# Patient Record
Sex: FEMALE | Race: WHITE | NOT HISPANIC OR LATINO | Employment: FULL TIME | URBAN - METROPOLITAN AREA
[De-identification: names, ages, dates, MRNs, and addresses within clinical notes are randomized per-mention and may not be internally consistent; named-entity substitution may affect disease eponyms.]

---

## 2023-03-06 DIAGNOSIS — J44.9 CHRONIC OBSTRUCTIVE PULMONARY DISEASE, UNSPECIFIED COPD TYPE (MULTI): Primary | ICD-10-CM

## 2023-03-06 RX ORDER — BENZONATATE 200 MG/1
200 CAPSULE ORAL 3 TIMES DAILY PRN
Qty: 42 CAPSULE | Refills: 5 | Status: SHIPPED | OUTPATIENT
Start: 2023-03-06 | End: 2023-05-01

## 2023-04-25 DIAGNOSIS — Z91.09 OTHER ALLERGY STATUS, OTHER THAN TO DRUGS AND BIOLOGICAL SUBSTANCES: ICD-10-CM

## 2023-04-27 RX ORDER — LORATADINE 10 MG/1
TABLET ORAL
Qty: 90 TABLET | Refills: 3 | Status: SHIPPED | OUTPATIENT
Start: 2023-04-27

## 2023-08-11 ENCOUNTER — TELEPHONE (OUTPATIENT)
Dept: PRIMARY CARE | Facility: CLINIC | Age: 53
End: 2023-08-11
Payer: COMMERCIAL

## 2023-11-03 DIAGNOSIS — U07.1 COVID: Primary | ICD-10-CM

## 2023-12-05 ENCOUNTER — TELEPHONE (OUTPATIENT)
Dept: PRIMARY CARE | Facility: CLINIC | Age: 53
End: 2023-12-05
Payer: COMMERCIAL

## 2023-12-05 RX ORDER — BENZONATATE 200 MG/1
200 CAPSULE ORAL 3 TIMES DAILY PRN
Status: ON HOLD | COMMUNITY
End: 2024-05-07 | Stop reason: WASHOUT

## 2023-12-08 DIAGNOSIS — Z00.00 ENCOUNTER FOR GENERAL ADULT MEDICAL EXAMINATION WITHOUT ABNORMAL FINDINGS: ICD-10-CM

## 2023-12-12 RX ORDER — BENZONATATE 100 MG/1
100 CAPSULE ORAL 3 TIMES DAILY PRN
Qty: 90 CAPSULE | Refills: 1 | Status: SHIPPED | OUTPATIENT
Start: 2023-12-12

## 2024-01-15 ENCOUNTER — APPOINTMENT (OUTPATIENT)
Dept: PRIMARY CARE | Facility: CLINIC | Age: 54
End: 2024-01-15
Payer: COMMERCIAL

## 2024-02-21 ENCOUNTER — APPOINTMENT (OUTPATIENT)
Dept: PRIMARY CARE | Facility: CLINIC | Age: 54
End: 2024-02-21

## 2024-05-06 ENCOUNTER — ANESTHESIA EVENT (OUTPATIENT)
Dept: OPERATING ROOM | Facility: HOSPITAL | Age: 54
DRG: 494 | End: 2024-05-06
Payer: MEDICARE

## 2024-05-06 ENCOUNTER — CLINICAL SUPPORT (OUTPATIENT)
Dept: EMERGENCY MEDICINE | Facility: HOSPITAL | Age: 54
DRG: 494 | End: 2024-05-06
Payer: MEDICARE

## 2024-05-06 ENCOUNTER — APPOINTMENT (OUTPATIENT)
Dept: RADIOLOGY | Facility: HOSPITAL | Age: 54
DRG: 494 | End: 2024-05-06
Payer: MEDICARE

## 2024-05-06 ENCOUNTER — HOSPITAL ENCOUNTER (INPATIENT)
Facility: HOSPITAL | Age: 54
LOS: 1 days | Discharge: HOME | DRG: 494 | End: 2024-05-07
Attending: EMERGENCY MEDICINE | Admitting: ORTHOPAEDIC SURGERY
Payer: MEDICARE

## 2024-05-06 ENCOUNTER — OFFICE VISIT (OUTPATIENT)
Dept: ORTHOPEDIC SURGERY | Facility: HOSPITAL | Age: 54
End: 2024-05-06
Payer: MEDICARE

## 2024-05-06 VITALS — WEIGHT: 164 LBS | BODY MASS INDEX: 32.2 KG/M2 | HEIGHT: 60 IN

## 2024-05-06 DIAGNOSIS — S42.352A DISPLACED COMMINUTED FRACTURE OF SHAFT OF HUMERUS, LEFT ARM, INITIAL ENCOUNTER FOR CLOSED FRACTURE: Primary | ICD-10-CM

## 2024-05-06 DIAGNOSIS — G89.18 ACUTE POSTOPERATIVE PAIN: ICD-10-CM

## 2024-05-06 DIAGNOSIS — S42.302S CLOSED FRACTURE OF SHAFT OF LEFT HUMERUS, UNSPECIFIED FRACTURE MORPHOLOGY, SEQUELA: Primary | ICD-10-CM

## 2024-05-06 PROBLEM — S42.302A CLOSED FRACTURE OF SHAFT OF LEFT HUMERUS: Status: ACTIVE | Noted: 2024-05-06

## 2024-05-06 LAB
ABO GROUP (TYPE) IN BLOOD: NORMAL
ABO GROUP (TYPE) IN BLOOD: NORMAL
ALBUMIN SERPL BCP-MCNC: 4.1 G/DL (ref 3.4–5)
ALP SERPL-CCNC: 79 U/L (ref 33–110)
ALT SERPL W P-5'-P-CCNC: 18 U/L (ref 7–45)
ANION GAP SERPL CALC-SCNC: 16 MMOL/L (ref 10–20)
ANTIBODY SCREEN: NORMAL
APTT PPP: 30 SECONDS (ref 27–38)
AST SERPL W P-5'-P-CCNC: 18 U/L (ref 9–39)
B-HCG SERPL-ACNC: 3 MIU/ML
BASOPHILS # BLD AUTO: 0.08 X10*3/UL (ref 0–0.1)
BASOPHILS NFR BLD AUTO: 0.7 %
BILIRUB SERPL-MCNC: 0.4 MG/DL (ref 0–1.2)
BUN SERPL-MCNC: 15 MG/DL (ref 6–23)
CALCIUM SERPL-MCNC: 9 MG/DL (ref 8.6–10.6)
CHLORIDE SERPL-SCNC: 102 MMOL/L (ref 98–107)
CO2 SERPL-SCNC: 27 MMOL/L (ref 21–32)
CREAT SERPL-MCNC: 0.52 MG/DL (ref 0.5–1.05)
EGFRCR SERPLBLD CKD-EPI 2021: >90 ML/MIN/1.73M*2
EOSINOPHIL # BLD AUTO: 0.25 X10*3/UL (ref 0–0.7)
EOSINOPHIL NFR BLD AUTO: 2.1 %
ERYTHROCYTE [DISTWIDTH] IN BLOOD BY AUTOMATED COUNT: 12.3 % (ref 11.5–14.5)
GLUCOSE SERPL-MCNC: 96 MG/DL (ref 74–99)
HCT VFR BLD AUTO: 37.5 % (ref 36–46)
HGB BLD-MCNC: 12.6 G/DL (ref 12–16)
IMM GRANULOCYTES # BLD AUTO: 0.04 X10*3/UL (ref 0–0.7)
IMM GRANULOCYTES NFR BLD AUTO: 0.3 % (ref 0–0.9)
INR PPP: 0.9 (ref 0.9–1.1)
LYMPHOCYTES # BLD AUTO: 4.25 X10*3/UL (ref 1.2–4.8)
LYMPHOCYTES NFR BLD AUTO: 36.5 %
MCH RBC QN AUTO: 30.3 PG (ref 26–34)
MCHC RBC AUTO-ENTMCNC: 33.6 G/DL (ref 32–36)
MCV RBC AUTO: 90 FL (ref 80–100)
MONOCYTES # BLD AUTO: 0.97 X10*3/UL (ref 0.1–1)
MONOCYTES NFR BLD AUTO: 8.3 %
NEUTROPHILS # BLD AUTO: 6.06 X10*3/UL (ref 1.2–7.7)
NEUTROPHILS NFR BLD AUTO: 52.1 %
NRBC BLD-RTO: 0 /100 WBCS (ref 0–0)
PLATELET # BLD AUTO: 205 X10*3/UL (ref 150–450)
POTASSIUM SERPL-SCNC: 3.5 MMOL/L (ref 3.5–5.3)
PROT SERPL-MCNC: 6.9 G/DL (ref 6.4–8.2)
PROTHROMBIN TIME: 10.1 SECONDS (ref 9.8–12.8)
RBC # BLD AUTO: 4.16 X10*6/UL (ref 4–5.2)
RH FACTOR (ANTIGEN D): NORMAL
RH FACTOR (ANTIGEN D): NORMAL
SODIUM SERPL-SCNC: 141 MMOL/L (ref 136–145)
WBC # BLD AUTO: 11.7 X10*3/UL (ref 4.4–11.3)

## 2024-05-06 PROCEDURE — 96374 THER/PROPH/DIAG INJ IV PUSH: CPT

## 2024-05-06 PROCEDURE — 99285 EMERGENCY DEPT VISIT HI MDM: CPT | Performed by: EMERGENCY MEDICINE

## 2024-05-06 PROCEDURE — 99214 OFFICE O/P EST MOD 30 MIN: CPT | Performed by: FAMILY MEDICINE

## 2024-05-06 PROCEDURE — 99285 EMERGENCY DEPT VISIT HI MDM: CPT | Mod: 25

## 2024-05-06 PROCEDURE — 36415 COLL VENOUS BLD VENIPUNCTURE: CPT

## 2024-05-06 PROCEDURE — 71045 X-RAY EXAM CHEST 1 VIEW: CPT

## 2024-05-06 PROCEDURE — 86901 BLOOD TYPING SEROLOGIC RH(D): CPT | Performed by: EMERGENCY MEDICINE

## 2024-05-06 PROCEDURE — 36415 COLL VENOUS BLD VENIPUNCTURE: CPT | Performed by: EMERGENCY MEDICINE

## 2024-05-06 PROCEDURE — 93010 ELECTROCARDIOGRAM REPORT: CPT | Performed by: EMERGENCY MEDICINE

## 2024-05-06 PROCEDURE — 73030 X-RAY EXAM OF SHOULDER: CPT | Mod: LEFT SIDE | Performed by: RADIOLOGY

## 2024-05-06 PROCEDURE — 73060 X-RAY EXAM OF HUMERUS: CPT | Mod: LT

## 2024-05-06 PROCEDURE — G0378 HOSPITAL OBSERVATION PER HR: HCPCS

## 2024-05-06 PROCEDURE — 73070 X-RAY EXAM OF ELBOW: CPT | Mod: LT

## 2024-05-06 PROCEDURE — 85025 COMPLETE CBC W/AUTO DIFF WBC: CPT | Performed by: EMERGENCY MEDICINE

## 2024-05-06 PROCEDURE — 99222 1ST HOSP IP/OBS MODERATE 55: CPT | Performed by: ORTHOPAEDIC SURGERY

## 2024-05-06 PROCEDURE — 2500000001 HC RX 250 WO HCPCS SELF ADMINISTERED DRUGS (ALT 637 FOR MEDICARE OP)

## 2024-05-06 PROCEDURE — 73070 X-RAY EXAM OF ELBOW: CPT | Mod: LEFT SIDE | Performed by: RADIOLOGY

## 2024-05-06 PROCEDURE — 96376 TX/PRO/DX INJ SAME DRUG ADON: CPT

## 2024-05-06 PROCEDURE — 99204 OFFICE O/P NEW MOD 45 MIN: CPT | Performed by: FAMILY MEDICINE

## 2024-05-06 PROCEDURE — 93005 ELECTROCARDIOGRAM TRACING: CPT

## 2024-05-06 PROCEDURE — 84075 ASSAY ALKALINE PHOSPHATASE: CPT | Performed by: EMERGENCY MEDICINE

## 2024-05-06 PROCEDURE — 96375 TX/PRO/DX INJ NEW DRUG ADDON: CPT

## 2024-05-06 PROCEDURE — 73030 X-RAY EXAM OF SHOULDER: CPT | Mod: LT

## 2024-05-06 PROCEDURE — 85610 PROTHROMBIN TIME: CPT | Performed by: EMERGENCY MEDICINE

## 2024-05-06 PROCEDURE — 2500000004 HC RX 250 GENERAL PHARMACY W/ HCPCS (ALT 636 FOR OP/ED): Mod: SE

## 2024-05-06 PROCEDURE — 84702 CHORIONIC GONADOTROPIN TEST: CPT

## 2024-05-06 PROCEDURE — 73060 X-RAY EXAM OF HUMERUS: CPT | Mod: LEFT SIDE | Performed by: RADIOLOGY

## 2024-05-06 PROCEDURE — 71045 X-RAY EXAM CHEST 1 VIEW: CPT | Mod: FOREIGN READ | Performed by: RADIOLOGY

## 2024-05-06 RX ORDER — METHOCARBAMOL 500 MG/1
750 TABLET, FILM COATED ORAL EVERY 8 HOURS PRN
Status: DISCONTINUED | OUTPATIENT
Start: 2024-05-06 | End: 2024-05-07 | Stop reason: HOSPADM

## 2024-05-06 RX ORDER — NALOXONE HYDROCHLORIDE 0.4 MG/ML
0.2 INJECTION, SOLUTION INTRAMUSCULAR; INTRAVENOUS; SUBCUTANEOUS EVERY 5 MIN PRN
Status: DISCONTINUED | OUTPATIENT
Start: 2024-05-06 | End: 2024-05-07 | Stop reason: HOSPADM

## 2024-05-06 RX ORDER — OXYCODONE HYDROCHLORIDE 5 MG/1
10 TABLET ORAL EVERY 4 HOURS PRN
Status: DISCONTINUED | OUTPATIENT
Start: 2024-05-06 | End: 2024-05-07 | Stop reason: HOSPADM

## 2024-05-06 RX ORDER — HYDROMORPHONE HYDROCHLORIDE 1 MG/ML
0.5 INJECTION, SOLUTION INTRAMUSCULAR; INTRAVENOUS; SUBCUTANEOUS ONCE
Status: COMPLETED | OUTPATIENT
Start: 2024-05-06 | End: 2024-05-06

## 2024-05-06 RX ORDER — OXYCODONE HYDROCHLORIDE 5 MG/1
5 TABLET ORAL EVERY 6 HOURS PRN
Status: DISCONTINUED | OUTPATIENT
Start: 2024-05-06 | End: 2024-05-07 | Stop reason: HOSPADM

## 2024-05-06 RX ORDER — AMOXICILLIN 250 MG
2 CAPSULE ORAL 2 TIMES DAILY
Status: DISCONTINUED | OUTPATIENT
Start: 2024-05-06 | End: 2024-05-07 | Stop reason: HOSPADM

## 2024-05-06 RX ORDER — SODIUM CHLORIDE, SODIUM LACTATE, POTASSIUM CHLORIDE, CALCIUM CHLORIDE 600; 310; 30; 20 MG/100ML; MG/100ML; MG/100ML; MG/100ML
100 INJECTION, SOLUTION INTRAVENOUS CONTINUOUS
Status: DISCONTINUED | OUTPATIENT
Start: 2024-05-07 | End: 2024-05-07

## 2024-05-06 RX ORDER — POLYETHYLENE GLYCOL 3350 17 G/17G
17 POWDER, FOR SOLUTION ORAL DAILY
Status: DISCONTINUED | OUTPATIENT
Start: 2024-05-06 | End: 2024-05-07 | Stop reason: HOSPADM

## 2024-05-06 RX ORDER — HYDROMORPHONE HYDROCHLORIDE 1 MG/ML
0.5 INJECTION, SOLUTION INTRAMUSCULAR; INTRAVENOUS; SUBCUTANEOUS EVERY 2 HOUR PRN
Status: DISCONTINUED | OUTPATIENT
Start: 2024-05-06 | End: 2024-05-07

## 2024-05-06 RX ORDER — BISACODYL 5 MG
10 TABLET, DELAYED RELEASE (ENTERIC COATED) ORAL DAILY PRN
Status: DISCONTINUED | OUTPATIENT
Start: 2024-05-06 | End: 2024-05-07 | Stop reason: HOSPADM

## 2024-05-06 RX ORDER — ONDANSETRON 4 MG/1
4 TABLET, ORALLY DISINTEGRATING ORAL EVERY 8 HOURS PRN
Status: DISCONTINUED | OUTPATIENT
Start: 2024-05-06 | End: 2024-05-07 | Stop reason: HOSPADM

## 2024-05-06 RX ORDER — ONDANSETRON HYDROCHLORIDE 2 MG/ML
4 INJECTION, SOLUTION INTRAVENOUS EVERY 8 HOURS PRN
Status: DISCONTINUED | OUTPATIENT
Start: 2024-05-06 | End: 2024-05-07 | Stop reason: HOSPADM

## 2024-05-06 RX ORDER — ACETAMINOPHEN 325 MG/1
650 TABLET ORAL EVERY 6 HOURS SCHEDULED
Status: DISCONTINUED | OUTPATIENT
Start: 2024-05-06 | End: 2024-05-07 | Stop reason: HOSPADM

## 2024-05-06 RX ORDER — ONDANSETRON HYDROCHLORIDE 2 MG/ML
4 INJECTION, SOLUTION INTRAVENOUS ONCE
Status: COMPLETED | OUTPATIENT
Start: 2024-05-06 | End: 2024-05-06

## 2024-05-06 RX ADMIN — ONDANSETRON 4 MG: 2 INJECTION INTRAMUSCULAR; INTRAVENOUS at 10:44

## 2024-05-06 RX ADMIN — METHOCARBAMOL 750 MG: 500 TABLET ORAL at 16:49

## 2024-05-06 RX ADMIN — HYDROMORPHONE HYDROCHLORIDE 0.5 MG: 1 INJECTION, SOLUTION INTRAMUSCULAR; INTRAVENOUS; SUBCUTANEOUS at 10:44

## 2024-05-06 RX ADMIN — OXYCODONE HYDROCHLORIDE 10 MG: 5 TABLET ORAL at 16:49

## 2024-05-06 RX ADMIN — ACETAMINOPHEN 650 MG: 325 TABLET ORAL at 18:25

## 2024-05-06 RX ADMIN — OXYCODONE HYDROCHLORIDE 10 MG: 5 TABLET ORAL at 22:33

## 2024-05-06 RX ADMIN — HYDROMORPHONE HYDROCHLORIDE 0.5 MG: 1 INJECTION, SOLUTION INTRAMUSCULAR; INTRAVENOUS; SUBCUTANEOUS at 12:17

## 2024-05-06 SDOH — SOCIAL STABILITY: SOCIAL INSECURITY: DOES ANYONE TRY TO KEEP YOU FROM HAVING/CONTACTING OTHER FRIENDS OR DOING THINGS OUTSIDE YOUR HOME?: NO

## 2024-05-06 SDOH — SOCIAL STABILITY: SOCIAL INSECURITY: ARE YOU OR HAVE YOU BEEN THREATENED OR ABUSED PHYSICALLY, EMOTIONALLY, OR SEXUALLY BY ANYONE?: YES

## 2024-05-06 SDOH — SOCIAL STABILITY: SOCIAL INSECURITY: HAS ANYONE EVER THREATENED TO HURT YOUR FAMILY OR YOUR PETS?: NO

## 2024-05-06 SDOH — SOCIAL STABILITY: SOCIAL INSECURITY: HAVE YOU HAD ANY THOUGHTS OF HARMING ANYONE ELSE?: NO

## 2024-05-06 SDOH — SOCIAL STABILITY: SOCIAL INSECURITY: ABUSE: ADULT

## 2024-05-06 SDOH — SOCIAL STABILITY: SOCIAL INSECURITY: DO YOU FEEL UNSAFE GOING BACK TO THE PLACE WHERE YOU ARE LIVING?: YES

## 2024-05-06 SDOH — SOCIAL STABILITY: SOCIAL INSECURITY: WERE YOU ABLE TO COMPLETE ALL THE BEHAVIORAL HEALTH SCREENINGS?: YES

## 2024-05-06 SDOH — SOCIAL STABILITY: SOCIAL INSECURITY: DO YOU FEEL ANYONE HAS EXPLOITED OR TAKEN ADVANTAGE OF YOU FINANCIALLY OR OF YOUR PERSONAL PROPERTY?: NO

## 2024-05-06 SDOH — SOCIAL STABILITY: SOCIAL INSECURITY: HAVE YOU HAD THOUGHTS OF HARMING ANYONE ELSE?: NO

## 2024-05-06 SDOH — SOCIAL STABILITY: SOCIAL INSECURITY: ARE THERE ANY APPARENT SIGNS OF INJURIES/BEHAVIORS THAT COULD BE RELATED TO ABUSE/NEGLECT?: NO

## 2024-05-06 ASSESSMENT — COLUMBIA-SUICIDE SEVERITY RATING SCALE - C-SSRS
2. HAVE YOU ACTUALLY HAD ANY THOUGHTS OF KILLING YOURSELF?: NO
6. HAVE YOU EVER DONE ANYTHING, STARTED TO DO ANYTHING, OR PREPARED TO DO ANYTHING TO END YOUR LIFE?: NO
1. IN THE PAST MONTH, HAVE YOU WISHED YOU WERE DEAD OR WISHED YOU COULD GO TO SLEEP AND NOT WAKE UP?: NO

## 2024-05-06 ASSESSMENT — LIFESTYLE VARIABLES
TOTAL SCORE: 0
HOW OFTEN DO YOU HAVE 6 OR MORE DRINKS ON ONE OCCASION: NEVER
SUBSTANCE_ABUSE_PAST_12_MONTHS: NO
EVER FELT BAD OR GUILTY ABOUT YOUR DRINKING: NO
PRESCIPTION_ABUSE_PAST_12_MONTHS: NO
EVER HAD A DRINK FIRST THING IN THE MORNING TO STEADY YOUR NERVES TO GET RID OF A HANGOVER: NO
AUDIT-C TOTAL SCORE: 0
HAVE PEOPLE ANNOYED YOU BY CRITICIZING YOUR DRINKING: NO
HOW OFTEN DO YOU HAVE A DRINK CONTAINING ALCOHOL: NEVER
HOW MANY STANDARD DRINKS CONTAINING ALCOHOL DO YOU HAVE ON A TYPICAL DAY: PATIENT DOES NOT DRINK
AUDIT-C TOTAL SCORE: 0
HAVE YOU EVER FELT YOU SHOULD CUT DOWN ON YOUR DRINKING: NO
SKIP TO QUESTIONS 9-10: 1

## 2024-05-06 ASSESSMENT — PAIN SCALES - GENERAL
PAINLEVEL_OUTOF10: 8
PAINLEVEL_OUTOF10: 7
PAINLEVEL_OUTOF10: 7
PAINLEVEL_OUTOF10: 4
PAINLEVEL_OUTOF10: 8
PAINLEVEL_OUTOF10: 10 - WORST POSSIBLE PAIN
PAINLEVEL_OUTOF10: 10 - WORST POSSIBLE PAIN

## 2024-05-06 ASSESSMENT — PATIENT HEALTH QUESTIONNAIRE - PHQ9
SUM OF ALL RESPONSES TO PHQ9 QUESTIONS 1 & 2: 0
1. LITTLE INTEREST OR PLEASURE IN DOING THINGS: NOT AT ALL
2. FEELING DOWN, DEPRESSED OR HOPELESS: NOT AT ALL

## 2024-05-06 ASSESSMENT — PAIN - FUNCTIONAL ASSESSMENT
PAIN_FUNCTIONAL_ASSESSMENT: 0-10
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
## 2024-05-06 NOTE — H&P
Orthopaedic Surgery H&P Note      Subjective:    Injury: L midshaft humerus fx  HPI: 53F RHD (COPD, 1ppd) p/a fall horseback riding 3 days ago in [place]. Splint from OSH taken down. Closed, NVI. XR w comminuted midshaft humerus fx.     Orthopaedic Problems/Injuries: as above  Other Injuries: none    PMH: per above/EMR  PSH: per above/EMR  SocHx:      - Lives at home      - 1ppd smoker      - Denies other drug use  FamHx:  Non-contributory to this patient's acute orthopaedic problem other than as mentioned in HPI  All: Reviewed in EMR  Meds: Reviewed in EMR    ROS      - 14 point ROS negative except as above    Objective:  Physical Exam:   · Physical Exam:  - Constitutional: No acute distress, cooperative  - Eyes: EOM grossly intact  - Head/Neck: Trachea midline  - Respiratory/Thorax: Normal work of breathing  - Cardiovascular: RRR on peripheral palpation  - Gastrointestinal: Nondistended  - Psychological: Appropriate mood/behavior  - Skin: Warm and dry. Additional findings in musculoskeletal evaluation  - Musculoskeletal:  Left upper extremity:   -Skin intact.   -Tender at site of injury with painful ROM.  -Fires axillary/AIN/PIN/ulnar distributions  -SILT axillary/radial/median/ulnar distributions  -Hand warm, well perfused  -Palpable radial pulse, cap refill brisk  -Compartments soft and compressible     Results for orders placed or performed during the hospital encounter of 05/06/24 (from the past 24 hour(s))   CBC and Auto Differential   Result Value Ref Range    WBC 11.7 (H) 4.4 - 11.3 x10*3/uL    nRBC 0.0 0.0 - 0.0 /100 WBCs    RBC 4.16 4.00 - 5.20 x10*6/uL    Hemoglobin 12.6 12.0 - 16.0 g/dL    Hematocrit 37.5 36.0 - 46.0 %    MCV 90 80 - 100 fL    MCH 30.3 26.0 - 34.0 pg    MCHC 33.6 32.0 - 36.0 g/dL    RDW 12.3 11.5 - 14.5 %    Platelets 205 150 - 450 x10*3/uL    Neutrophils % 52.1 40.0 - 80.0 %    Immature Granulocytes %, Automated 0.3 0.0 - 0.9 %    Lymphocytes % 36.5 13.0 - 44.0 %    Monocytes % 8.3 2.0  - 10.0 %    Eosinophils % 2.1 0.0 - 6.0 %    Basophils % 0.7 0.0 - 2.0 %    Neutrophils Absolute 6.06 1.20 - 7.70 x10*3/uL    Immature Granulocytes Absolute, Automated 0.04 0.00 - 0.70 x10*3/uL    Lymphocytes Absolute 4.25 1.20 - 4.80 x10*3/uL    Monocytes Absolute 0.97 0.10 - 1.00 x10*3/uL    Eosinophils Absolute 0.25 0.00 - 0.70 x10*3/uL    Basophils Absolute 0.08 0.00 - 0.10 x10*3/uL   Comprehensive metabolic panel   Result Value Ref Range    Glucose 96 74 - 99 mg/dL    Sodium 141 136 - 145 mmol/L    Potassium 3.5 3.5 - 5.3 mmol/L    Chloride 102 98 - 107 mmol/L    Bicarbonate 27 21 - 32 mmol/L    Anion Gap 16 10 - 20 mmol/L    Urea Nitrogen 15 6 - 23 mg/dL    Creatinine 0.52 0.50 - 1.05 mg/dL    eGFR >90 >60 mL/min/1.73m*2    Calcium 9.0 8.6 - 10.6 mg/dL    Albumin 4.1 3.4 - 5.0 g/dL    Alkaline Phosphatase 79 33 - 110 U/L    Total Protein 6.9 6.4 - 8.2 g/dL    AST 18 9 - 39 U/L    Bilirubin, Total 0.4 0.0 - 1.2 mg/dL    ALT 18 7 - 45 U/L   Coagulation Screen   Result Value Ref Range    Protime 10.1 9.8 - 12.8 seconds    INR 0.9 0.9 - 1.1    aPTT 30 27 - 38 seconds   hCG, quantitative, pregnancy   Result Value Ref Range    HCG, Beta-Quantitative 3 <5 mIU/mL       XR shoulder left 2+ views   Final Result   1.  Oblique fractures in the midshaft of the left humerus.  Dedicated   films of the left humerus is recommended.   2.  No acute osseous findings seen within the left shoulder   3.  Mild degenerative changes within the left shoulder.   Signed by Elfego Easton MD      XR humerus left   Final Result   1.  Mildly displaced oblique fractures of the midshaft of the left   humerus.   Signed by Elfego Easton MD      XR elbow left 1-2 views   Final Result   1.  Mild soft tissue swelling over the posterior aspect of left elbow.   2.  No acute osseous findings appreciated.   Signed by Elfego Easton MD      XR chest 1 view   Final Result   No detectable active cardiopulmonary disease.   Signed by Siddhartha Baez MD      XR  humerus left    (Results Pending)       Assessment/Plan:    Injury: L midshaft humerus fx  HPI: 53F RHD (COPD, 1ppd) p/a fall horseback riding 3 days ago. Closed, NVI. XR w comminuted midshaft humerus fx. Coaptation splint.  Plan: NWOSKAR LUDIONNA. C/p ORIF L humerus w Dr. Grissom 5/7. No anticipated clearance issues.    Plan:   - Admit to orthopaedic surgery  - Consented and added to OR schedule for ORIF L humerus with Dr. Grissom on 5/7  - NPO at midnight for upcoming procedure.  - Preop labs/orders are complete: EKG, CXR, CBC, BMP, Coags, T+S, Pregnancy Test  - Strict Bedrest, NWB RUE in coaptation splint   - Pre-operative ABx: None indicated    - No indication for pre-operative transfusion, 2U pRBC on hold for OR   - Tylenol, oxycodone, dilaudid for pain control  - LR @ 100 cc/hr when NPO.   - SCDs, will hold AM dose of DVT ppx the morning of surgery  - Maintain all tubes/lines/drains  - Continue home meds: per EMR, no meds needed to be restarted    Plan discussed with attending, Dr. Grissom.     Angelo Mayorga MD  Orthopaedic Surgery PGY-1  Pager: [phone] (Epic Chat preferred)    This patient will be followed by the Orthopaedic Trauma service. Please page or Epic Chat the corresponding residents below with questions or concerns.     Ortho Trauma Service (Epic Chat Preferred)  First call: Angelo Mayorga, PGY1  Second call: Jose Cruz, PGY2  Third call: Hernan Delgadillo, PGY3    On weekends and after 6PM:  At [facility]: Please reach out to the orthopaedic on-call resident ([phone])  At [facility]: Please reach out to the orthopaedic on-call AGGIE or resident (please refer to Dina)

## 2024-05-06 NOTE — PROGRESS NOTES
** Please excuse any errors in grammar or translation related to this dictation. Voice recognition software was utilized to prepare this document. **    Assessment & Plan:    Dx: Displaced left humerus fracture    I spoke with on-call Ortho trauma surgeon Dr. Grissom in regards to this case.  Patient was given option to go to [facility] ER and be admitted for surgical intervention versus seeing Dr. Grissom this week in clinic and planning surgical intervention from there.  Due to the severity of her pain she plans to go to the ER today for admission.  Dr. Grissom made aware of this.  Posterior arm splint and sling in place.      Chief complaint:  Arm fracture    HPI:  53-year-old female presents the Ortho injury clinic with report of left arm fracture.  She states she was riding a horse while vacation in [place] and fell off.  Was evaluated in the ER, had x-rays completed, placed into a posterior splint and sling.  She is using hydrocodone/APAP for pain control though it is minimally helpful.  She has sensation intact distal to her injury.    There is no problem list on file for this patient.    Past Surgical History:   Procedure Laterality Date    CERVICAL BIOPSY  W/ LOOP ELECTRODE EXCISION  2017    Cervical Loop Electrosurgical Excision (LEEP)     SECTION, CLASSIC  2017     Section    DILATION AND CURETTAGE OF UTERUS  2017    Dilation And Curettage    OTHER SURGICAL HISTORY  2017    Surgically Induced  - By Dilation And Curettage     Current Outpatient Medications on File Prior to Visit   Medication Sig Dispense Refill    benzonatate (Tessalon) 100 mg capsule TAKE ONE CAPSULE BY MOUTH THREE TIMES DAILY AS NEEDED 90 capsule 1    benzonatate (Tessalon) 200 mg capsule Take 1 capsule (200 mg) by mouth 3 times a day as needed for cough.      loratadine (Claritin) 10 mg tablet TAKE 1 TABLET BY MOUTH EVERY DAY 90 tablet 3     No current facility-administered medications on file prior  to visit.       Exam:  General Exam:  Constitutional - NAD, AAO x 3, conversing appropriately.  Appears uncomfortable.  HEENT- Normocephalic and atraumatic. EOMI, PERRLA, No scleral icterus. No facial deformities. Hearing grossly normal.  Lungs - Breathing non-labored with normal rate. No accessory muscle use.  CV - Extremities warm and well-perfused, brisk capillary refill present.   Neuro - CN II-XII grossly intact.    Limited exam completed on the patient's left arm due to fracture.  Sensation intact to light touch throughout her left upper extremity.  2+ radial ulnar pulses present.  Brisk capillary refill.  Normal  strength compared to contralateral.    Results:  X-rays of left arm completed 5/3/2024 personally interpreted as displaced humeral shaft fracture.    Lab Results   Component Value Date    CREATININE 0.63 02/08/2023         (0) independent

## 2024-05-06 NOTE — ED TRIAGE NOTES
Pt presents to the ED after injuring her arm Friday after falling off of a horse on Friday in [place]. Pt sought medical intervention there and then saw ortho outpatient today. Pt was found to have a L humeral fx and Hollie CANO asked for pt to come to ER for admission for surgery. MSP intact. Severe swelling noted to L hand. Pt is currently in a sling with a cast.
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
## 2024-05-07 ENCOUNTER — APPOINTMENT (OUTPATIENT)
Dept: RADIOLOGY | Facility: HOSPITAL | Age: 54
DRG: 494 | End: 2024-05-07
Payer: MEDICARE

## 2024-05-07 ENCOUNTER — ANESTHESIA (OUTPATIENT)
Dept: OPERATING ROOM | Facility: HOSPITAL | Age: 54
DRG: 494 | End: 2024-05-07
Payer: MEDICARE

## 2024-05-07 VITALS
SYSTOLIC BLOOD PRESSURE: 139 MMHG | TEMPERATURE: 97.3 F | WEIGHT: 162 LBS | BODY MASS INDEX: 31.8 KG/M2 | DIASTOLIC BLOOD PRESSURE: 78 MMHG | HEART RATE: 110 BPM | OXYGEN SATURATION: 92 % | RESPIRATION RATE: 16 BRPM | HEIGHT: 60 IN

## 2024-05-07 PROBLEM — J44.9 CHRONIC OBSTRUCTIVE PULMONARY DISEASE (MULTI): Status: ACTIVE | Noted: 2024-05-07

## 2024-05-07 PROBLEM — S42.302S: Status: ACTIVE | Noted: 2024-05-07

## 2024-05-07 PROBLEM — C52 VAGINAL CANCER (MULTI): Status: ACTIVE | Noted: 2024-05-07

## 2024-05-07 LAB
ATRIAL RATE: 98 BPM
P AXIS: 76 DEGREES
P OFFSET: 204 MS
P ONSET: 157 MS
PR INTERVAL: 130 MS
Q ONSET: 222 MS
QRS COUNT: 16 BEATS
QRS DURATION: 70 MS
QT INTERVAL: 328 MS
QTC CALCULATION(BAZETT): 418 MS
QTC FREDERICIA: 386 MS
R AXIS: 83 DEGREES
T AXIS: 77 DEGREES
T OFFSET: 386 MS
VENTRICULAR RATE: 98 BPM

## 2024-05-07 PROCEDURE — 2500000001 HC RX 250 WO HCPCS SELF ADMINISTERED DRUGS (ALT 637 FOR MEDICARE OP)

## 2024-05-07 PROCEDURE — A4217 STERILE WATER/SALINE, 500 ML: HCPCS | Performed by: ORTHOPAEDIC SURGERY

## 2024-05-07 PROCEDURE — 2780000003 HC OR 278 NO HCPCS: Performed by: ORTHOPAEDIC SURGERY

## 2024-05-07 PROCEDURE — A24515 PR OPEN FIXATN MID HUMERUS FRACTURE: Performed by: ANESTHESIOLOGY

## 2024-05-07 PROCEDURE — 3600000009 HC OR TIME - EACH INCREMENTAL 1 MINUTE - PROCEDURE LEVEL FOUR: Performed by: ORTHOPAEDIC SURGERY

## 2024-05-07 PROCEDURE — 2720000007 HC OR 272 NO HCPCS: Performed by: ORTHOPAEDIC SURGERY

## 2024-05-07 PROCEDURE — C1776 JOINT DEVICE (IMPLANTABLE): HCPCS | Performed by: ORTHOPAEDIC SURGERY

## 2024-05-07 PROCEDURE — 2500000004 HC RX 250 GENERAL PHARMACY W/ HCPCS (ALT 636 FOR OP/ED)

## 2024-05-07 PROCEDURE — 24515 OPTX HUMRL SHFT FX PLATE/SCR: CPT | Performed by: STUDENT IN AN ORGANIZED HEALTH CARE EDUCATION/TRAINING PROGRAM

## 2024-05-07 PROCEDURE — 24515 OPTX HUMRL SHFT FX PLATE/SCR: CPT | Performed by: ORTHOPAEDIC SURGERY

## 2024-05-07 PROCEDURE — A24515 PR OPEN FIXATN MID HUMERUS FRACTURE: Performed by: NURSE ANESTHETIST, CERTIFIED REGISTERED

## 2024-05-07 PROCEDURE — 2500000004 HC RX 250 GENERAL PHARMACY W/ HCPCS (ALT 636 FOR OP/ED): Mod: JZ

## 2024-05-07 PROCEDURE — 99222 1ST HOSP IP/OBS MODERATE 55: CPT | Performed by: ORTHOPAEDIC SURGERY

## 2024-05-07 PROCEDURE — C1713 ANCHOR/SCREW BN/BN,TIS/BN: HCPCS | Performed by: ORTHOPAEDIC SURGERY

## 2024-05-07 PROCEDURE — 2500000004 HC RX 250 GENERAL PHARMACY W/ HCPCS (ALT 636 FOR OP/ED): Performed by: NURSE ANESTHETIST, CERTIFIED REGISTERED

## 2024-05-07 PROCEDURE — 0PSG04Z REPOSITION LEFT HUMERAL SHAFT WITH INTERNAL FIXATION DEVICE, OPEN APPROACH: ICD-10-PCS | Performed by: OBSTETRICS & GYNECOLOGY

## 2024-05-07 PROCEDURE — 1100000001 HC PRIVATE ROOM DAILY

## 2024-05-07 PROCEDURE — 2500000002 HC RX 250 W HCPCS SELF ADMINISTERED DRUGS (ALT 637 FOR MEDICARE OP, ALT 636 FOR OP/ED): Performed by: NURSE ANESTHETIST, CERTIFIED REGISTERED

## 2024-05-07 PROCEDURE — 7100000001 HC RECOVERY ROOM TIME - INITIAL BASE CHARGE: Performed by: ORTHOPAEDIC SURGERY

## 2024-05-07 PROCEDURE — 2500000005 HC RX 250 GENERAL PHARMACY W/O HCPCS: Performed by: NURSE ANESTHETIST, CERTIFIED REGISTERED

## 2024-05-07 PROCEDURE — 96376 TX/PRO/DX INJ SAME DRUG ADON: CPT | Mod: 59

## 2024-05-07 PROCEDURE — 3700000001 HC GENERAL ANESTHESIA TIME - INITIAL BASE CHARGE: Performed by: ORTHOPAEDIC SURGERY

## 2024-05-07 PROCEDURE — 2500000004 HC RX 250 GENERAL PHARMACY W/ HCPCS (ALT 636 FOR OP/ED): Performed by: ORTHOPAEDIC SURGERY

## 2024-05-07 PROCEDURE — 3600000004 HC OR TIME - INITIAL BASE CHARGE - PROCEDURE LEVEL FOUR: Performed by: ORTHOPAEDIC SURGERY

## 2024-05-07 PROCEDURE — G0378 HOSPITAL OBSERVATION PER HR: HCPCS

## 2024-05-07 PROCEDURE — 3700000002 HC GENERAL ANESTHESIA TIME - EACH INCREMENTAL 1 MINUTE: Performed by: ORTHOPAEDIC SURGERY

## 2024-05-07 PROCEDURE — 7100000002 HC RECOVERY ROOM TIME - EACH INCREMENTAL 1 MINUTE: Performed by: ORTHOPAEDIC SURGERY

## 2024-05-07 PROCEDURE — 96375 TX/PRO/DX INJ NEW DRUG ADDON: CPT | Mod: 59

## 2024-05-07 PROCEDURE — 2500000004 HC RX 250 GENERAL PHARMACY W/ HCPCS (ALT 636 FOR OP/ED): Performed by: ANESTHESIOLOGY

## 2024-05-07 PROCEDURE — 96365 THER/PROPH/DIAG IV INF INIT: CPT | Mod: 59

## 2024-05-07 PROCEDURE — A4649 SURGICAL SUPPLIES: HCPCS | Performed by: ORTHOPAEDIC SURGERY

## 2024-05-07 DEVICE — SCREW, CORT 3.5 X 22 TI: Type: IMPLANTABLE DEVICE | Site: ARM | Status: FUNCTIONAL

## 2024-05-07 DEVICE — IMPLANTABLE DEVICE: Type: IMPLANTABLE DEVICE | Site: ARM | Status: FUNCTIONAL

## 2024-05-07 RX ORDER — MIDAZOLAM HYDROCHLORIDE 1 MG/ML
INJECTION INTRAMUSCULAR; INTRAVENOUS AS NEEDED
Status: DISCONTINUED | OUTPATIENT
Start: 2024-05-07 | End: 2024-05-07

## 2024-05-07 RX ORDER — ASPIRIN 81 MG/1
81 TABLET ORAL 2 TIMES DAILY
Qty: 84 TABLET | Refills: 0 | Status: SHIPPED | OUTPATIENT
Start: 2024-05-07 | End: 2024-06-21

## 2024-05-07 RX ORDER — ONDANSETRON 8 MG/1
8 TABLET, FILM COATED ORAL EVERY 8 HOURS PRN
Qty: 15 TABLET | Refills: 0 | Status: SHIPPED | OUTPATIENT
Start: 2024-05-07 | End: 2024-05-07

## 2024-05-07 RX ORDER — CEFAZOLIN SODIUM 2 G/100ML
2 INJECTION, SOLUTION INTRAVENOUS EVERY 8 HOURS
Qty: 300 ML | Refills: 0 | Status: DISCONTINUED | OUTPATIENT
Start: 2024-05-07 | End: 2024-05-07 | Stop reason: HOSPADM

## 2024-05-07 RX ORDER — SODIUM CHLORIDE, SODIUM LACTATE, POTASSIUM CHLORIDE, CALCIUM CHLORIDE 600; 310; 30; 20 MG/100ML; MG/100ML; MG/100ML; MG/100ML
100 INJECTION, SOLUTION INTRAVENOUS CONTINUOUS
Status: ACTIVE | OUTPATIENT
Start: 2024-05-07 | End: 2024-05-07

## 2024-05-07 RX ORDER — ACETAMINOPHEN 325 MG/1
650 TABLET ORAL EVERY 6 HOURS PRN
Qty: 30 TABLET | Refills: 0 | Status: SHIPPED | OUTPATIENT
Start: 2024-05-07 | End: 2024-05-07

## 2024-05-07 RX ORDER — LIDOCAINE HYDROCHLORIDE 10 MG/ML
0.1 INJECTION, SOLUTION INFILTRATION; PERINEURAL ONCE
Status: DISCONTINUED | OUTPATIENT
Start: 2024-05-07 | End: 2024-05-07 | Stop reason: HOSPADM

## 2024-05-07 RX ORDER — ONDANSETRON 8 MG/1
8 TABLET, FILM COATED ORAL EVERY 8 HOURS PRN
Qty: 15 TABLET | Refills: 0 | Status: SHIPPED | OUTPATIENT
Start: 2024-05-07 | End: 2024-07-03 | Stop reason: WASHOUT

## 2024-05-07 RX ORDER — HYDROMORPHONE HYDROCHLORIDE 1 MG/ML
0.5 INJECTION, SOLUTION INTRAMUSCULAR; INTRAVENOUS; SUBCUTANEOUS EVERY 5 MIN PRN
Status: DISCONTINUED | OUTPATIENT
Start: 2024-05-07 | End: 2024-05-07 | Stop reason: HOSPADM

## 2024-05-07 RX ORDER — PHENYLEPHRINE HCL IN 0.9% NACL 0.4MG/10ML
SYRINGE (ML) INTRAVENOUS AS NEEDED
Status: DISCONTINUED | OUTPATIENT
Start: 2024-05-07 | End: 2024-05-07

## 2024-05-07 RX ORDER — OXYCODONE HYDROCHLORIDE 5 MG/1
5 TABLET ORAL EVERY 6 HOURS
Qty: 28 TABLET | Refills: 0 | Status: SHIPPED | OUTPATIENT
Start: 2024-05-07 | End: 2024-05-07

## 2024-05-07 RX ORDER — ASPIRIN 81 MG/1
81 TABLET ORAL 2 TIMES DAILY
Status: DISCONTINUED | OUTPATIENT
Start: 2024-05-07 | End: 2024-05-07 | Stop reason: HOSPADM

## 2024-05-07 RX ORDER — DOCUSATE SODIUM 100 MG/1
100 CAPSULE, LIQUID FILLED ORAL 2 TIMES DAILY
Qty: 60 CAPSULE | Refills: 0 | Status: SHIPPED | OUTPATIENT
Start: 2024-05-07 | End: 2024-06-06

## 2024-05-07 RX ORDER — CALCIUM CARBONATE/VITAMIN D3 500 MG-10
1 TABLET,CHEWABLE ORAL 2 TIMES DAILY
Qty: 60 TABLET | Refills: 0 | Status: SHIPPED | OUTPATIENT
Start: 2024-05-07 | End: 2024-05-07

## 2024-05-07 RX ORDER — METHOCARBAMOL 100 MG/ML
1000 INJECTION, SOLUTION INTRAMUSCULAR; INTRAVENOUS ONCE
Status: COMPLETED | OUTPATIENT
Start: 2024-05-07 | End: 2024-05-07

## 2024-05-07 RX ORDER — DOCUSATE SODIUM 100 MG/1
100 CAPSULE, LIQUID FILLED ORAL 2 TIMES DAILY
Qty: 60 CAPSULE | Refills: 0 | Status: SHIPPED | OUTPATIENT
Start: 2024-05-07 | End: 2024-05-07

## 2024-05-07 RX ORDER — PROPOFOL 10 MG/ML
INJECTION, EMULSION INTRAVENOUS AS NEEDED
Status: DISCONTINUED | OUTPATIENT
Start: 2024-05-07 | End: 2024-05-07

## 2024-05-07 RX ORDER — VANCOMYCIN HYDROCHLORIDE 1 G/20ML
INJECTION, POWDER, LYOPHILIZED, FOR SOLUTION INTRAVENOUS AS NEEDED
Status: DISCONTINUED | OUTPATIENT
Start: 2024-05-07 | End: 2024-05-07 | Stop reason: HOSPADM

## 2024-05-07 RX ORDER — ACETAMINOPHEN 325 MG/1
650 TABLET ORAL EVERY 6 HOURS PRN
Qty: 30 TABLET | Refills: 0 | Status: ON HOLD | OUTPATIENT
Start: 2024-05-07 | End: 2024-10-02 | Stop reason: ALTCHOICE

## 2024-05-07 RX ORDER — CYCLOBENZAPRINE HCL 10 MG
10 TABLET ORAL 3 TIMES DAILY PRN
Qty: 21 TABLET | Refills: 0 | Status: SHIPPED | OUTPATIENT
Start: 2024-05-07 | End: 2024-05-07

## 2024-05-07 RX ORDER — CEPHALEXIN 500 MG/1
500 CAPSULE ORAL 4 TIMES DAILY
Qty: 4 CAPSULE | Refills: 0 | Status: SHIPPED | OUTPATIENT
Start: 2024-05-07 | End: 2024-05-07

## 2024-05-07 RX ORDER — HYDROMORPHONE HYDROCHLORIDE 1 MG/ML
0.2 INJECTION, SOLUTION INTRAMUSCULAR; INTRAVENOUS; SUBCUTANEOUS EVERY 2 HOUR PRN
Status: DISCONTINUED | OUTPATIENT
Start: 2024-05-07 | End: 2024-05-07 | Stop reason: HOSPADM

## 2024-05-07 RX ORDER — CYCLOBENZAPRINE HCL 10 MG
10 TABLET ORAL 3 TIMES DAILY PRN
Qty: 21 TABLET | Refills: 0 | Status: SHIPPED | OUTPATIENT
Start: 2024-05-07 | End: 2024-05-22 | Stop reason: SDUPTHER

## 2024-05-07 RX ORDER — CEPHALEXIN 500 MG/1
500 CAPSULE ORAL 4 TIMES DAILY
Qty: 4 CAPSULE | Refills: 0 | Status: SHIPPED | OUTPATIENT
Start: 2024-05-07 | End: 2024-05-08

## 2024-05-07 RX ORDER — ASPIRIN 81 MG/1
81 TABLET ORAL 2 TIMES DAILY
Qty: 84 TABLET | Refills: 0 | Status: SHIPPED | OUTPATIENT
Start: 2024-05-07 | End: 2024-05-07

## 2024-05-07 RX ORDER — ALBUTEROL SULFATE 0.83 MG/ML
SOLUTION RESPIRATORY (INHALATION) AS NEEDED
Status: DISCONTINUED | OUTPATIENT
Start: 2024-05-07 | End: 2024-05-07

## 2024-05-07 RX ORDER — OXYCODONE HYDROCHLORIDE 5 MG/1
5 TABLET ORAL EVERY 6 HOURS
Qty: 28 TABLET | Refills: 0 | Status: SHIPPED | OUTPATIENT
Start: 2024-05-07 | End: 2024-05-14

## 2024-05-07 RX ORDER — ROCURONIUM BROMIDE 10 MG/ML
INJECTION, SOLUTION INTRAVENOUS AS NEEDED
Status: DISCONTINUED | OUTPATIENT
Start: 2024-05-07 | End: 2024-05-07

## 2024-05-07 RX ORDER — SODIUM CHLORIDE, SODIUM GLUCONATE, SODIUM ACETATE, POTASSIUM CHLORIDE AND MAGNESIUM CHLORIDE 30; 37; 368; 526; 502 MG/100ML; MG/100ML; MG/100ML; MG/100ML; MG/100ML
INJECTION, SOLUTION INTRAVENOUS CONTINUOUS PRN
Status: DISCONTINUED | OUTPATIENT
Start: 2024-05-07 | End: 2024-05-07

## 2024-05-07 RX ORDER — LIDOCAINE HCL/PF 100 MG/5ML
SYRINGE (ML) INTRAVENOUS AS NEEDED
Status: DISCONTINUED | OUTPATIENT
Start: 2024-05-07 | End: 2024-05-07

## 2024-05-07 RX ORDER — SODIUM CHLORIDE, SODIUM LACTATE, POTASSIUM CHLORIDE, CALCIUM CHLORIDE 600; 310; 30; 20 MG/100ML; MG/100ML; MG/100ML; MG/100ML
100 INJECTION, SOLUTION INTRAVENOUS CONTINUOUS
Status: DISCONTINUED | OUTPATIENT
Start: 2024-05-07 | End: 2024-05-07 | Stop reason: HOSPADM

## 2024-05-07 RX ORDER — FENTANYL CITRATE 50 UG/ML
INJECTION, SOLUTION INTRAMUSCULAR; INTRAVENOUS AS NEEDED
Status: DISCONTINUED | OUTPATIENT
Start: 2024-05-07 | End: 2024-05-07

## 2024-05-07 RX ORDER — SODIUM CHLORIDE 0.9 G/100ML
INJECTION, SOLUTION IRRIGATION AS NEEDED
Status: DISCONTINUED | OUTPATIENT
Start: 2024-05-07 | End: 2024-05-07 | Stop reason: HOSPADM

## 2024-05-07 RX ORDER — TRANEXAMIC ACID 100 MG/ML
INJECTION, SOLUTION INTRAVENOUS AS NEEDED
Status: DISCONTINUED | OUTPATIENT
Start: 2024-05-07 | End: 2024-05-07

## 2024-05-07 RX ORDER — ONDANSETRON HYDROCHLORIDE 2 MG/ML
4 INJECTION, SOLUTION INTRAVENOUS ONCE
Status: COMPLETED | OUTPATIENT
Start: 2024-05-07 | End: 2024-05-07

## 2024-05-07 RX ORDER — TOBRAMYCIN 1.2 G/30ML
INJECTION, POWDER, LYOPHILIZED, FOR SOLUTION INTRAVENOUS AS NEEDED
Status: DISCONTINUED | OUTPATIENT
Start: 2024-05-07 | End: 2024-05-07 | Stop reason: HOSPADM

## 2024-05-07 RX ORDER — CEFAZOLIN 1 G/1
INJECTION, POWDER, FOR SOLUTION INTRAVENOUS AS NEEDED
Status: DISCONTINUED | OUTPATIENT
Start: 2024-05-07 | End: 2024-05-07

## 2024-05-07 RX ORDER — HYDROMORPHONE HYDROCHLORIDE 1 MG/ML
0.2 INJECTION, SOLUTION INTRAMUSCULAR; INTRAVENOUS; SUBCUTANEOUS EVERY 5 MIN PRN
Status: DISCONTINUED | OUTPATIENT
Start: 2024-05-07 | End: 2024-05-07 | Stop reason: HOSPADM

## 2024-05-07 RX ORDER — CALCIUM CARBONATE/VITAMIN D3 500 MG-10
1 TABLET,CHEWABLE ORAL 2 TIMES DAILY
Qty: 60 TABLET | Refills: 0 | Status: SHIPPED | OUTPATIENT
Start: 2024-05-07 | End: 2024-06-06

## 2024-05-07 RX ADMIN — Medication 50 MG: at 07:49

## 2024-05-07 RX ADMIN — ROCURONIUM BROMIDE 80 MG: 10 INJECTION INTRAVENOUS at 07:30

## 2024-05-07 RX ADMIN — METHOCARBAMOL 1000 MG: 100 INJECTION INTRAMUSCULAR; INTRAVENOUS at 10:31

## 2024-05-07 RX ADMIN — ROCURONIUM BROMIDE 10 MG: 10 INJECTION INTRAVENOUS at 09:05

## 2024-05-07 RX ADMIN — PROPOFOL 20 MG: 10 INJECTION, EMULSION INTRAVENOUS at 09:28

## 2024-05-07 RX ADMIN — Medication 80 MCG: at 08:24

## 2024-05-07 RX ADMIN — LIDOCAINE HYDROCHLORIDE 100 MG: 20 INJECTION INTRAVENOUS at 07:21

## 2024-05-07 RX ADMIN — DEXAMETHASONE SODIUM PHOSPHATE 4 MG: 4 INJECTION INTRA-ARTICULAR; INTRALESIONAL; INTRAMUSCULAR; INTRAVENOUS; SOFT TISSUE at 08:59

## 2024-05-07 RX ADMIN — CEFAZOLIN 2 G: 1 INJECTION, POWDER, FOR SOLUTION INTRAMUSCULAR; INTRAVENOUS at 07:39

## 2024-05-07 RX ADMIN — ALBUTEROL SULFATE 2.5 MG: 2.5 SOLUTION RESPIRATORY (INHALATION) at 07:00

## 2024-05-07 RX ADMIN — HYDROMORPHONE HYDROCHLORIDE 0.5 MG: 1 INJECTION, SOLUTION INTRAMUSCULAR; INTRAVENOUS; SUBCUTANEOUS at 10:16

## 2024-05-07 RX ADMIN — HYDROMORPHONE HYDROCHLORIDE 0.5 MG: 1 INJECTION, SOLUTION INTRAMUSCULAR; INTRAVENOUS; SUBCUTANEOUS at 10:31

## 2024-05-07 RX ADMIN — HYDROMORPHONE HYDROCHLORIDE 0.5 MG: 1 INJECTION, SOLUTION INTRAMUSCULAR; INTRAVENOUS; SUBCUTANEOUS at 10:11

## 2024-05-07 RX ADMIN — ONDANSETRON 4 MG: 2 INJECTION INTRAMUSCULAR; INTRAVENOUS at 10:31

## 2024-05-07 RX ADMIN — OXYCODONE HYDROCHLORIDE 10 MG: 5 TABLET ORAL at 16:37

## 2024-05-07 RX ADMIN — HYDROMORPHONE HYDROCHLORIDE 0.4 MG: 1 INJECTION, SOLUTION INTRAMUSCULAR; INTRAVENOUS; SUBCUTANEOUS at 08:43

## 2024-05-07 RX ADMIN — TRANEXAMIC ACID 1000 MG: 100 INJECTION INTRAVENOUS at 07:40

## 2024-05-07 RX ADMIN — PROPOFOL 150 MG: 10 INJECTION, EMULSION INTRAVENOUS at 07:30

## 2024-05-07 RX ADMIN — MIDAZOLAM HYDROCHLORIDE 2 MG: 1 INJECTION, SOLUTION INTRAMUSCULAR; INTRAVENOUS at 07:15

## 2024-05-07 RX ADMIN — SODIUM CHLORIDE, SODIUM GLUCONATE, SODIUM ACETATE, POTASSIUM CHLORIDE AND MAGNESIUM CHLORIDE: 526; 502; 368; 37; 30 INJECTION, SOLUTION INTRAVENOUS at 07:18

## 2024-05-07 RX ADMIN — HYDROMORPHONE HYDROCHLORIDE 0.5 MG: 1 INJECTION, SOLUTION INTRAMUSCULAR; INTRAVENOUS; SUBCUTANEOUS at 11:33

## 2024-05-07 RX ADMIN — PROMETHAZINE HYDROCHLORIDE 6.25 MG: 25 INJECTION INTRAMUSCULAR; INTRAVENOUS at 11:34

## 2024-05-07 RX ADMIN — HYDROMORPHONE HYDROCHLORIDE 0.5 MG: 1 INJECTION, SOLUTION INTRAMUSCULAR; INTRAVENOUS; SUBCUTANEOUS at 10:52

## 2024-05-07 RX ADMIN — ROCURONIUM BROMIDE 10 MG: 10 INJECTION INTRAVENOUS at 09:24

## 2024-05-07 RX ADMIN — ONDANSETRON 4 MG: 2 INJECTION INTRAMUSCULAR; INTRAVENOUS at 08:59

## 2024-05-07 RX ADMIN — SODIUM CHLORIDE, POTASSIUM CHLORIDE, SODIUM LACTATE AND CALCIUM CHLORIDE 100 ML/HR: 600; 310; 30; 20 INJECTION, SOLUTION INTRAVENOUS at 00:07

## 2024-05-07 RX ADMIN — FENTANYL CITRATE 100 MCG: 50 INJECTION, SOLUTION INTRAMUSCULAR; INTRAVENOUS at 07:18

## 2024-05-07 RX ADMIN — ROCURONIUM BROMIDE 20 MG: 10 INJECTION INTRAVENOUS at 08:35

## 2024-05-07 RX ADMIN — HYDROMORPHONE HYDROCHLORIDE 0.5 MG: 1 INJECTION, SOLUTION INTRAMUSCULAR; INTRAVENOUS; SUBCUTANEOUS at 10:22

## 2024-05-07 RX ADMIN — PROPOFOL 30 MG: 10 INJECTION, EMULSION INTRAVENOUS at 09:32

## 2024-05-07 RX ADMIN — HYDROMORPHONE HYDROCHLORIDE 0.5 MG: 1 INJECTION, SOLUTION INTRAMUSCULAR; INTRAVENOUS; SUBCUTANEOUS at 10:45

## 2024-05-07 RX ADMIN — HYDROMORPHONE HYDROCHLORIDE 0.5 MG: 1 INJECTION, SOLUTION INTRAMUSCULAR; INTRAVENOUS; SUBCUTANEOUS at 10:06

## 2024-05-07 RX ADMIN — ACETAMINOPHEN 650 MG: 325 TABLET ORAL at 00:06

## 2024-05-07 RX ADMIN — METHOCARBAMOL 750 MG: 500 TABLET ORAL at 16:37

## 2024-05-07 RX ADMIN — HYDROMORPHONE HYDROCHLORIDE 1 MG: 1 INJECTION, SOLUTION INTRAMUSCULAR; INTRAVENOUS; SUBCUTANEOUS at 09:54

## 2024-05-07 RX ADMIN — OXYCODONE HYDROCHLORIDE 10 MG: 5 TABLET ORAL at 05:10

## 2024-05-07 RX ADMIN — HYDROMORPHONE HYDROCHLORIDE 0.6 MG: 1 INJECTION, SOLUTION INTRAMUSCULAR; INTRAVENOUS; SUBCUTANEOUS at 09:06

## 2024-05-07 RX ADMIN — METHOCARBAMOL 750 MG: 500 TABLET ORAL at 05:10

## 2024-05-07 RX ADMIN — ACETAMINOPHEN 650 MG: 325 TABLET ORAL at 05:10

## 2024-05-07 RX ADMIN — CEFAZOLIN SODIUM 2 G: 2 INJECTION, SOLUTION INTRAVENOUS at 16:38

## 2024-05-07 SDOH — HEALTH STABILITY: MENTAL HEALTH: CURRENT SMOKER: 1

## 2024-05-07 ASSESSMENT — ACTIVITIES OF DAILY LIVING (ADL)
FEEDING YOURSELF: INDEPENDENT
BATHING: INDEPENDENT
TOILETING: INDEPENDENT
JUDGMENT_ADEQUATE_SAFELY_COMPLETE_DAILY_ACTIVITIES: YES
GROOMING: INDEPENDENT
ADEQUATE_TO_COMPLETE_ADL: YES
HEARING - RIGHT EAR: FUNCTIONAL
WALKS IN HOME: INDEPENDENT
DRESSING YOURSELF: INDEPENDENT
HEARING - LEFT EAR: FUNCTIONAL
PATIENT'S MEMORY ADEQUATE TO SAFELY COMPLETE DAILY ACTIVITIES?: YES

## 2024-05-07 ASSESSMENT — PAIN - FUNCTIONAL ASSESSMENT

## 2024-05-07 ASSESSMENT — PAIN SCALES - GENERAL
PAINLEVEL_OUTOF10: 7
PAINLEVEL_OUTOF10: 7
PAINLEVEL_OUTOF10: 8
PAINLEVEL_OUTOF10: 8
PAINLEVEL_OUTOF10: 7
PAINLEVEL_OUTOF10: 8
PAINLEVEL_OUTOF10: 10 - WORST POSSIBLE PAIN
PAINLEVEL_OUTOF10: 6
PAINLEVEL_OUTOF10: 5 - MODERATE PAIN
PAINLEVEL_OUTOF10: 10 - WORST POSSIBLE PAIN
PAINLEVEL_OUTOF10: 8
PAINLEVEL_OUTOF10: 7
PAINLEVEL_OUTOF10: 8
PAINLEVEL_OUTOF10: 8

## 2024-05-07 NOTE — DISCHARGE INSTRUCTIONS
Orthopaedic Surgery Discharge Instructions    Weight bearing status: coffee cup weight bearing L arm, range of motion as tolerated L shoulder elbow and wrist    VTE Prophylaxis (Blood Clot Prevention): ASA 81 BID    Antibiotics: 1 day oral keflex to be taken 5/8    Home Medication: Resume all home medications    Resume normal diet     Leave operative dressing in place until POD7. Then remove and leave incision open to air. Let water run freely over incision when showering, do not scrub. Do not soak in pool or tub. Do not swim in pools or ponds until 3 months after surgery.    Call if any drainage after 7 days, increased redness/warmth/swelling at incision site, pain/tenderness of calf, swelling of calf that does not respond to elevation, SOB/chest pain.    Call for any questions or concerns.     MEDICATION SIDE EFFECTS.  OXYCODONE: constipation, nausea, vomiting, upset stomach, (sleepiness), dizziness, lightheadedness, itching, headache, blurred vision, dry mouth, sweating  TRAMADOL: headache, dizziness, drowsiness, tired feeling; constipation, diarrhea, nausea, vomiting, stomach pain; feeling nervous or anxious, itching, sweating, flushing.  ASPIRIN:  upset stomach, heartburn; drowsiness; or headache    Follow up with Dr. Grissom in 2 weeks. Call [phone] to schedule/confirm appointment.     
17.6

## 2024-05-07 NOTE — PROGRESS NOTES
"Pharmacy Medication History Review    Jasbir Diamond is a 53 y.o. female admitted for Closed fracture of shaft of left humerus. Pharmacy reviewed the patient's [id number] medications and allergies for accuracy.    The list below reflects the updated PTA list. Comments regarding how patient may be taking medications differently can be found in the Admit Orders Activity  Prior to Admission Medications   Prescriptions Last Dose Informant Patient Reported? Taking?   benzonatate (Tessalon) 100 mg capsule  Self No Yes   Sig: TAKE ONE CAPSULE BY MOUTH THREE TIMES DAILY AS NEEDED   loratadine (Claritin) 10 mg tablet  Self No Yes   Sig: TAKE 1 TABLET BY MOUTH EVERY DAY      Facility-Administered Medications: None        The list below reflects the updated allergy list. Please review each documented allergy for additional clarification and justification.  Allergies  Reviewed by Demetria Xie PharmD on 5/7/2024        Severity Reactions Comments    Penicillins High Anaphylaxis     Codeine Low Itching             Patient accepts M2B at discharge. Pharmacy has been updated to [facility].    Sources used to complete the med history include the outpatient dispense report, OARRS, 5/6 Ortho visit note, 7/24 OBGYN visit note, and patient interview (able to list medications and directions).    Below are additional concerns with the patient's PTA list.  - Patient states she was previously prescribed an albuterol inhaler but never uses this medication.    Demetria Xie PharmD  [facility] PGY1 Pharmacy Resident  [facility]  Please reach out via [employer] Secure Chat for questions, or if no response call [phone] or SportsBoard \"MedRec\"    "

## 2024-05-07 NOTE — H&P
H&P reviewed. The patient was examined and there are no changes to the H&P. Patient electing to proceed with surgery. Patient marked and consented.      Quirino Montano MD  Orthopaedic Surgery, PGY-2  EpicChat preferred

## 2024-05-07 NOTE — NURSING NOTE
Patient discharged home, iv's  removed discharge instructions explained, meds sent to her pharmacy,discharge instructions explained and given with fu appt, pt verbalized understanding  Litzy Mann RN

## 2024-05-07 NOTE — ANESTHESIA POSTPROCEDURE EVALUATION
Patient: Jasbir Diamond    Procedure Summary       Date: 05/07/24 Room / Location: [facility] OR 01 / [facility] OR    Anesthesia Start: 0718 Anesthesia Stop: 0955    Procedure: Open Reduction Internal Fixation Humerus (Left: Arm Upper) Diagnosis:       Closed fracture of shaft of left humerus, unspecified fracture morphology, sequela      (Closed fracture of shaft of left humerus, unspecified fracture morphology, sequela [S42.302S])    Surgeons: Kunal Grissom MD Responsible Provider: Timmy Prajapati DO    Anesthesia Type: general ASA Status: 3            Anesthesia Type: general    Vitals Value Taken Time   /59 05/07/24 1200   Temp 36.2 °C (97.2 °F) 05/07/24 1200   Pulse 106 05/07/24 1203   Resp 7 05/07/24 1203   SpO2 96 % 05/07/24 1203   Vitals shown include unfiled device data.    Anesthesia Post Evaluation    Patient location during evaluation: PACU  Patient participation: complete - patient participated  Level of consciousness: awake  Pain management: adequate  Airway patency: patent  Cardiovascular status: acceptable  Respiratory status: acceptable  Hydration status: acceptable  Postoperative Nausea and Vomiting: moderate        No notable events documented.

## 2024-05-07 NOTE — DISCHARGE SUMMARY
Discharge Diagnosis  Closed fracture of shaft of left humerus    Issues Requiring Follow-Up  Routine Postoperative Followup    Test Results Pending At Discharge  Pending Labs       No current pending labs.            Hospital Course  53 year-old female who presented with left midshaft humerus fracture. Patient is now s/p ORIF L humerus on 5/7/2024 by Dr. Grissom. On the day of surgery, patient was identified in the pre-operative holding area and agreeable to proceed with surgery. Written consent was obtained.  Please see operative note for further details of this procedure. Patient received 24 hours of alfonso-operative antibiotics. Patient recovered in the PACU before transfer to a regular nursing floor. Patient was started on oxycodone, tylenol for pain control and ASA 81 mg bid for DVT prophylaxis. Physical therapy was consulted. On the day of discharge, patient was afebrile with stable vital signs. Patient was neurovascularly intact at time of discharge. Patient was discharged with prescription of ASA 81 mg bid for DVT prophylaxis for 4 weeks. Patient will follow-up with Dr. Grissom in 2 weeks for post-operative visit.      Home Medications     Medication List      START taking these medications     acetaminophen 325 mg tablet; Commonly known as: TylenoL; Take 2 tablets   (650 mg) by mouth every 6 hours if needed for mild pain (1 - 3) for up to   30 doses.   aspirin 81 mg EC tablet; Take 1 tablet (81 mg) by mouth 2 times a day.   calcium carbonate-vitamin D3 500 mg-10 mcg (400 unit) chewable tablet;   Chew 1 tablet by mouth 2 times a day.   cephalexin 500 mg capsule; Commonly known as: Keflex; Take 1 capsule   (500 mg) by mouth 4 times a day for 1 day.   cyclobenzaprine 10 mg tablet; Commonly known as: Flexeril; Take 1 tablet   (10 mg) by mouth 3 times a day as needed for muscle spasms for up to 7   days.   docusate sodium 100 mg capsule; Commonly known as: Colace; Take 1   capsule (100 mg) by mouth 2 times a day.    ondansetron 8 mg tablet; Commonly known as: Zofran; Take 1 tablet (8 mg)   by mouth every 8 hours if needed for nausea or vomiting for up to 15   doses.   oxyCODONE 5 mg immediate release tablet; Commonly known as: Roxicodone;   Take 1 tablet (5 mg) by mouth every 6 hours for 7 days.     CONTINUE taking these medications     benzonatate 100 mg capsule; Commonly known as: Tessalon; TAKE ONE   CAPSULE BY MOUTH THREE TIMES DAILY AS NEEDED   loratadine 10 mg tablet; Commonly known as: Claritin; TAKE 1 TABLET BY   MOUTH EVERY DAY       Outpatient Follow-Up  Future Appointments   Date Time Provider Department Center   5/22/2024 10:15 AM Randa Garcia PA-C [id number] [facility]       Angelo Mayorga MD  Orthopedic Surgery PGY-1  [facility]  Pager: [phone]  Available by Epic Chat

## 2024-05-07 NOTE — ANESTHESIA PROCEDURE NOTES
Airway  Date/Time: 5/7/2024 7:35 AM  Urgency: elective    Airway not difficult    Staffing  Performed: CRNA   Authorized by: Timmy Prajapati DO    Performed by: SHANE Gaytan-CAILIN  Patient location during procedure: OR    Indications and Patient Condition  Indications for airway management: anesthesia  Spontaneous ventilation: present  Sedation level: deep  Preoxygenated: yes  Patient position: sniffing  MILS maintained throughout  Mask difficulty assessment: 1 - vent by mask    Final Airway Details  Final airway type: endotracheal airway      Successful airway: ETT  Cuffed: yes   Successful intubation technique: direct laryngoscopy  Blade: [place]  Blade size: #3  ETT size (mm): 7.0  Cormack-Lehane Classification: grade I - full view of glottis  Placement verified by: chest auscultation and capnometry   Measured from: lips  ETT to lips (cm): 21  Number of attempts at approach: 1

## 2024-05-07 NOTE — CARE PLAN
The patient's goals for the shift include      The clinical goals for the shift include Pt will remain safe and pain controlled during my shift.    Pt remained safe and free of injury during night. Pain controlled with oxycodone. Walked to the bathroom several times. Numbness and tingling on her right hip to knee after the fall and team informed. MNNPO for surgery this morning. No other distress noted. Call light in reach. Resting quietly at this time.     Cristela Abreu RN'

## 2024-05-07 NOTE — PROGRESS NOTES
Orthopaedic Surgery Progress Note:    S: NAEON. AFVSS. Pain well controlled. NPO for OR today.    O:    Constitutional: NAD, resting comfortably in bed  Skin: Warm and dry, no rashes   Eyes: EOMI, clear sclera   ENMT: MMM   HEENT: Neck supple without apparent injury, EOMI, MMM  Respiratory: NWOB on RA   CV: RRR per peripheral pulses, limbs wwp  GI: soft, non-distended   Lymph: No apparent LAD  Neuro: BURGESS spontaneously, CNs II - XII grossly intact   Psych: Appropriate mood and behavior   MSK:   LUE:   -Skin intact, tender at site  -Motor intact in axillary/AIN/PIN/ulnar distributions  -SILT axillary/radial/median/ulnar distributions  -Hand wwp, 2+ radial pulse, cap refill brisk  -Compartments soft and compressible, no pain with passive stretch of digits    A full secondary survey was conducted. Patient did not have any acute pain with ROM or palpation of other extremities other than that which is mentioned below.    A/P: 54yo F p/w L humeral shaft fx after fall from horse.     Plan:   - Admit to orthopaedic surgery  - Consented and added to OR schedule for ORIF L humerus with orthopedic surgery on 5/7  - NPO at midnight for upcoming procedure.  - Prior to transfer to floor please obtain EKG, CXR, CBC, BMP, Coags, T+S, Pregnancy Test  - NWB LUE in coaptation splint   - Pre-operative ABx: None indicated    - No indication for pre-operative transfusion   - Tylenol, oxycodone, dilaudid for pain control  - LR @ 100 cc/hr when NPO.   - SCDs, no chemoppx in setting of upcoming surgery  - Maintain all tubes/lines/drains  - Continue home meds: as indicated    D/w Dr Grissom    This patient will be followed by ortho trauma team (All Epic chat preferred):  1st call: Angelo Mayorga PGY1  2nd call: Jeffry Cruz PGY2  3rd call: Hernan Delgadillo PGY3     6pm-6am M-F, holidays, weekends please contact on-call resident @ [phone] w/ urgent questions/concerns.    Jeffry Cruz MD  Orthopedic Surgery, PGY-2

## 2024-05-07 NOTE — PROGRESS NOTES
Orthopaedic Surgery Progress Note:    S: Evaluated in postoperative period, doing well. Pain controlled on current regimen.  Denies any new onset numbness, tingling or weakness. Denies nausea, vomiting, chest pain, dyspnea, or calf tenderness. Denies any fever or chills.    O:    Constitutional: NAD, resting comfortably in bed  Skin: Warm and dry, no rashes   Eyes: EOMI, clear sclera   ENMT: MMM   HEENT: Neck supple without apparent injury, EOMI, MMM  Respiratory: NWOB on RA   CV: RRR per peripheral pulses, limbs wwp  GI: soft, non-distended   Lymph: No apparent LAD  Neuro: BURGESS spontaneously, CNs II - XII grossly intact   Psych: Appropriate mood and behavior   MSK:   LUE:   -Post-operative dressing in place without strikethrough bleeding.   -Motor intact in axillary/AIN/PIN/ulnar distributions  -SILT axillary/radial/median/ulnar distributions  -Hand wwp, 2+ radial pulse, cap refill brisk  -Compartments soft and compressible, no pain with passive stretch of digits    A full secondary survey was conducted. Patient did not have any acute pain with ROM or palpation of other extremities other than that which is mentioned below.    A/P: 54yo F p/w L humeral shaft fx after fall off horse. Now s/p ORIF L humerus on 5/7 with Dr Grissom.  Recovering appropriately    - Clear liquid diet, okay to advance as tolerated. Bowel Regimen: Colace, senna, dulcolax.  - Multimodal pain therapy: scheduled tylenol, prn oxycodone, dilaudid prn for breakthrough  - mIVF to continue until patient is tolerating good PO intake, HLIV w/ good PO; Will monitor BMP on POD 1 and prn thereafter  - Weightbearing: CCWB LUE, ROMAT L shoulder elbow wrist. PT/OT consult, to see by POD1 at the latest.   - Encourage IS  - Perioperative ancef q8 for 24 hours  - No indication for transfusion; monitor CBC POD1, repeat prn thereafter.  - DVT PPx: SCD, ASA 81 mg BID for chemoPPx   - Maintain PIV, no pascal  - Drain: none  - Continue home meds: as indicated  -  Glycemic: No issues    Dispo: pending PT/OT, possible today    This plan was discussed with the attending, Dr. Haskins.    This patient will be followed by ortho trauma team (All Epic chat preferred):  1st call: Angelo Mayorga PGY1  2nd call: Jeffry Cruz PGY2  3rd call: Hernan Delgadillo PGY3     6pm-6am M-F, holidays, weekends please contact on-call resident @ [phone] w/ urgent questions/concerns.    Jeffry Cruz MD  Orthopedic Surgery, PGY-2

## 2024-05-07 NOTE — OP NOTE
ORTHOPAEDIC SURGERY OPERATIVE REPORT      Date of Surgery: 5/7/2024    Surgeon: Kunal Grissom MD    Assistant Surgeon: MD Dr. Dave Crowell participated in this case as the assistant surgeon, performing components of the positioning, approach, debridement, reduction, fixation, and closure. Due to the nature and complexity of the case, no qualified resident of an appropriate level was available to assist.    Assistants:  Jeffry Cruz, PGY2    Preoperative Diagnosis:  Left midshaft humerus fracture    Postoperative Diagnosis:  Left midshaft humerus fracture    Procedures Performed:  Left midshaft humerus fracture open reduction internal fixation    Anesthesia: General anesthesia    IV Fluids: Per anesthesia record    Estimated Blood Loss:  50 mL    Complications: None    Implants:   [place] 2.7 mm independent cortical screws x2  [place] 2.7 mm [place] minifrag plate with associated screws  Vida 3.5 mm [place] broad plate with associated screws    Specimens: None    Intraoperative Findings: Stable fracture fixation and safe extra-articular hardware placement noted at the conclusion of the case.    Indications For Procedure:  Jasbir Diamond is a 53 y.o. female who was vacationing in [place] when she was bucked off of a horse and sustained an injury to her left upper extremity.  She was evaluated at a local emergency department where she was diagnosed as having a left midshaft humerus fracture.  The patient was splinted and returned back home.  The date of injury was 5/3/2024.  Patient was seen by Dr. Browne's clinic on 5/6/2024 at which time recommendation was made for either presentation to the emergency department for operative invention or later follow-up in the orthopedic trauma clinic.  Due to the severity of the patient's symptoms, she presented to the emergency department. Due to the nature of the patient's injury, they were indicated for operative stabilization.  Informed consent was  obtained after discussing the risks, benefits, and alternatives to the procedure.  Risks include pain, bleeding, infection, damage to nearby structures, malunion, nonunion, hardware complications, need for further procedures, blood clots, anesthesia risks, and death.  Decision was made to proceed.  The patient was then brought to the preoperative holding area on the day of surgery.    Procedure Detail:  The patient was met in the preoperative holding area where their identity was confirmed and the operative extremity was marked. The patient was taken back to the operating theater where a preinduction timeout was performed which confirmed the patient's identity, procedure to be performed, correct operative site, availability of imaging and implants, allergies, and preoperative antibiotics. Everyone present was in agreement. They were placed under general anesthesia without complication. The patient was transferred to the operating table and placed in the supine position. All bony prominences were well-padded. The patient's left upper extremity was then prepped and draped in the usual sterile fashion. A preprocedure timeout was performed which again confirmed the patient's identity, procedure to be performed, and correct operative site.  Everyone present was in agreement. Preoperative antibiotics were administered.    We began by marking out a standard anterior lateral approach to the humeral shaft.  Incision was made through skin and subcutaneous tissue.  Deeper dissection was then delivered to cautery.  The cephalic vein was identified and reflected laterally.  There was a crossing branch which was directing medially which was ligated with electrocautery.  We came down upon the biceps fascia and incised this with electrocautery.  The biceps was reflected medially.  Proximally, we identified the interval between the deltoid and pectoralis major.  Distally, we identified the brachialis and we performed a split of this  at the lateral third at the inner nervous junction.  At this point, we were able to come down upon the fracture site.  Fracture site was carefully exposed on either side.  Distally, we did identify several branches of the radial nerve which we traced back and protected throughout the entirety of the case.  There was the main distal fragment of the fracture as well as the main proximal piece.  Preoperative imaging indicated that there were 2 separate butterfly fragments, however, intraoperatively these were noted to essentially act as 1 intercalary butterfly piece.  This was reduced to the proximal segment with the use of a small pointed reduction clamp.  This was fixated using two 2.7 mm cortical screws placed in lag by technique fashion.  This compressed the fragment very well.  Following this, we carefully placed lobster-claw clamps around the proximal and distal segments and affected an anatomic reduction of the humeral shaft.  We attempted to provisionally stabilize this with multiple K wires.  However, this did not adequately control the rotation of the fracture.  K wires removed and we held the reduction manually.  We selected a Vida 2.7 mm plate and positioned this laterally.  This was secured on either side of the fracture with bicortical screws.  This plate was applied in compression mode.  This held the reduction very well except for a small amount of apex anterior deformity.  Whether this was satisfactory because he felt that the additional anterior plate would reduce the remainder of the fracture.  We selected an additional [place] 3.5 mm broad small frag plate and positioned this on the more anterior medial aspect of the humeral shaft.  Fluoroscopy confirmed appropriate plate position.  This was then secured on either side of the fracture using bicortical screws.  This plate was applied in compression mode as well.  Once we finished placing our screws in the plate we obtain her final fluoroscopic  images which demonstrated maintained satisfactory fracture reduction as well as safe hardware placement.  The wound was copiously irrigated with normal saline.  Vancomycin and tobramycin powder was placed in the wound.  The brachialis split was repaired as well as a small rent which had been created near the deltoid insertion at the time of lateral plate placement.  We utilized 0 Vicryl suture for this.  The deep dermal layer was closed using 2-0 Vicryl followed by staples for the skin.  All counts were correct x 2 at this time.  Sterile dressing was applied.    The drapes were taken down and the patient was awoken from anesthesia without complication. They were transferred back to their hospital bed and taken to PACU in stable condition.    Postoperative Plan:  The patient may be coffee cup weightbearing to the left upper extremity at this time.  She may utilize a sling as needed for comfort.  The patient will receive postoperative antibiotics.  We will start the patient on aspirin 81 mg twice daily for DVT prophylaxis.  The patient may discharge home from the hospital today if her pain is well-controlled, otherwise would plan for discharge home on postoperative day 1. The patient will follow up with Dr. Kunal Grissom MD in approximately 2 weeks time for clinical check, staple removal, and 2 view xrays of the left humerus (AP/lateral) at that time.      Dr. Kunal Grissom MD was present for the critical portions of the case and was otherwise immediately available to assist.      Vineet Acosta MD  Orthopaedic Trauma Fellow  5/7/2024

## 2024-05-19 ENCOUNTER — APPOINTMENT (OUTPATIENT)
Dept: RADIOLOGY | Facility: HOSPITAL | Age: 54
End: 2024-05-19
Payer: MEDICARE

## 2024-05-19 ENCOUNTER — HOSPITAL ENCOUNTER (EMERGENCY)
Facility: HOSPITAL | Age: 54
Discharge: HOME | End: 2024-05-19
Attending: EMERGENCY MEDICINE
Payer: MEDICARE

## 2024-05-19 VITALS
DIASTOLIC BLOOD PRESSURE: 86 MMHG | WEIGHT: 162 LBS | OXYGEN SATURATION: 98 % | HEIGHT: 60 IN | RESPIRATION RATE: 18 BRPM | TEMPERATURE: 98.2 F | HEART RATE: 92 BPM | SYSTOLIC BLOOD PRESSURE: 161 MMHG | BODY MASS INDEX: 31.8 KG/M2

## 2024-05-19 DIAGNOSIS — I73.9 PAD (PERIPHERAL ARTERY DISEASE) (CMS-HCC): ICD-10-CM

## 2024-05-19 DIAGNOSIS — M79.604 PAIN OF RIGHT LOWER EXTREMITY: ICD-10-CM

## 2024-05-19 DIAGNOSIS — I77.1: Primary | ICD-10-CM

## 2024-05-19 LAB
ABO GROUP (TYPE) IN BLOOD: NORMAL
ALBUMIN SERPL BCP-MCNC: 4.6 G/DL (ref 3.4–5)
ALP SERPL-CCNC: 92 U/L (ref 33–110)
ALT SERPL W P-5'-P-CCNC: 30 U/L (ref 7–45)
ANION GAP SERPL CALC-SCNC: 16 MMOL/L (ref 10–20)
ANTIBODY SCREEN: NORMAL
APTT PPP: 34 SECONDS (ref 27–38)
AST SERPL W P-5'-P-CCNC: 28 U/L (ref 9–39)
BASOPHILS # BLD AUTO: 0.08 X10*3/UL (ref 0–0.1)
BASOPHILS NFR BLD AUTO: 0.6 %
BILIRUB SERPL-MCNC: 0.4 MG/DL (ref 0–1.2)
BUN SERPL-MCNC: 17 MG/DL (ref 6–23)
CALCIUM SERPL-MCNC: 9.8 MG/DL (ref 8.6–10.6)
CHLORIDE SERPL-SCNC: 101 MMOL/L (ref 98–107)
CO2 SERPL-SCNC: 26 MMOL/L (ref 21–32)
CREAT SERPL-MCNC: 0.58 MG/DL (ref 0.5–1.05)
CRP SERPL-MCNC: 1.09 MG/DL
EGFRCR SERPLBLD CKD-EPI 2021: >90 ML/MIN/1.73M*2
EOSINOPHIL # BLD AUTO: 0.3 X10*3/UL (ref 0–0.7)
EOSINOPHIL NFR BLD AUTO: 2.2 %
ERYTHROCYTE [DISTWIDTH] IN BLOOD BY AUTOMATED COUNT: 12.3 % (ref 11.5–14.5)
ERYTHROCYTE [SEDIMENTATION RATE] IN BLOOD BY WESTERGREN METHOD: 43 MM/H (ref 0–30)
GLUCOSE SERPL-MCNC: 85 MG/DL (ref 74–99)
HCT VFR BLD AUTO: 42.1 % (ref 36–46)
HGB BLD-MCNC: 14.5 G/DL (ref 12–16)
IMM GRANULOCYTES # BLD AUTO: 0.05 X10*3/UL (ref 0–0.7)
IMM GRANULOCYTES NFR BLD AUTO: 0.4 % (ref 0–0.9)
INR PPP: 1 (ref 0.9–1.1)
LACTATE SERPL-SCNC: 1.5 MMOL/L (ref 0.4–2)
LYMPHOCYTES # BLD AUTO: 3.74 X10*3/UL (ref 1.2–4.8)
LYMPHOCYTES NFR BLD AUTO: 27.9 %
MCH RBC QN AUTO: 30.7 PG (ref 26–34)
MCHC RBC AUTO-ENTMCNC: 34.4 G/DL (ref 32–36)
MCV RBC AUTO: 89 FL (ref 80–100)
MONOCYTES # BLD AUTO: 0.71 X10*3/UL (ref 0.1–1)
MONOCYTES NFR BLD AUTO: 5.3 %
NEUTROPHILS # BLD AUTO: 8.54 X10*3/UL (ref 1.2–7.7)
NEUTROPHILS NFR BLD AUTO: 63.6 %
NRBC BLD-RTO: 0 /100 WBCS (ref 0–0)
PLATELET # BLD AUTO: 405 X10*3/UL (ref 150–450)
POTASSIUM SERPL-SCNC: 4.2 MMOL/L (ref 3.5–5.3)
PROT SERPL-MCNC: 8.1 G/DL (ref 6.4–8.2)
PROTHROMBIN TIME: 10.8 SECONDS (ref 9.8–12.8)
RBC # BLD AUTO: 4.72 X10*6/UL (ref 4–5.2)
RH FACTOR (ANTIGEN D): NORMAL
SODIUM SERPL-SCNC: 139 MMOL/L (ref 136–145)
WBC # BLD AUTO: 13.4 X10*3/UL (ref 4.4–11.3)

## 2024-05-19 PROCEDURE — 2500000001 HC RX 250 WO HCPCS SELF ADMINISTERED DRUGS (ALT 637 FOR MEDICARE OP): Mod: SE | Performed by: NURSE PRACTITIONER

## 2024-05-19 PROCEDURE — 85025 COMPLETE CBC W/AUTO DIFF WBC: CPT | Performed by: NURSE PRACTITIONER

## 2024-05-19 PROCEDURE — 36415 COLL VENOUS BLD VENIPUNCTURE: CPT | Performed by: NURSE PRACTITIONER

## 2024-05-19 PROCEDURE — 73564 X-RAY EXAM KNEE 4 OR MORE: CPT | Mod: RIGHT SIDE | Performed by: STUDENT IN AN ORGANIZED HEALTH CARE EDUCATION/TRAINING PROGRAM

## 2024-05-19 PROCEDURE — 85652 RBC SED RATE AUTOMATED: CPT | Performed by: NURSE PRACTITIONER

## 2024-05-19 PROCEDURE — 73060 X-RAY EXAM OF HUMERUS: CPT | Mod: LT

## 2024-05-19 PROCEDURE — 93971 EXTREMITY STUDY: CPT

## 2024-05-19 PROCEDURE — 75635 CT ANGIO ABDOMINAL ARTERIES: CPT

## 2024-05-19 PROCEDURE — 73552 X-RAY EXAM OF FEMUR 2/>: CPT | Mod: RT

## 2024-05-19 PROCEDURE — 2550000001 HC RX 255 CONTRASTS: Mod: SE | Performed by: EMERGENCY MEDICINE

## 2024-05-19 PROCEDURE — 99285 EMERGENCY DEPT VISIT HI MDM: CPT | Mod: 25

## 2024-05-19 PROCEDURE — 83605 ASSAY OF LACTIC ACID: CPT | Performed by: STUDENT IN AN ORGANIZED HEALTH CARE EDUCATION/TRAINING PROGRAM

## 2024-05-19 PROCEDURE — 86900 BLOOD TYPING SEROLOGIC ABO: CPT | Performed by: NURSE PRACTITIONER

## 2024-05-19 PROCEDURE — 73060 X-RAY EXAM OF HUMERUS: CPT | Mod: LEFT SIDE | Performed by: STUDENT IN AN ORGANIZED HEALTH CARE EDUCATION/TRAINING PROGRAM

## 2024-05-19 PROCEDURE — 36415 COLL VENOUS BLD VENIPUNCTURE: CPT | Performed by: STUDENT IN AN ORGANIZED HEALTH CARE EDUCATION/TRAINING PROGRAM

## 2024-05-19 PROCEDURE — 73503 X-RAY EXAM HIP UNI 4/> VIEWS: CPT | Mod: RT

## 2024-05-19 PROCEDURE — 99222 1ST HOSP IP/OBS MODERATE 55: CPT | Performed by: SURGERY

## 2024-05-19 PROCEDURE — 85610 PROTHROMBIN TIME: CPT | Performed by: NURSE PRACTITIONER

## 2024-05-19 PROCEDURE — 73552 X-RAY EXAM OF FEMUR 2/>: CPT | Mod: RIGHT SIDE | Performed by: STUDENT IN AN ORGANIZED HEALTH CARE EDUCATION/TRAINING PROGRAM

## 2024-05-19 PROCEDURE — 99285 EMERGENCY DEPT VISIT HI MDM: CPT | Performed by: NURSE PRACTITIONER

## 2024-05-19 PROCEDURE — 86140 C-REACTIVE PROTEIN: CPT | Performed by: NURSE PRACTITIONER

## 2024-05-19 PROCEDURE — 75635 CT ANGIO ABDOMINAL ARTERIES: CPT | Performed by: RADIOLOGY

## 2024-05-19 PROCEDURE — 86901 BLOOD TYPING SEROLOGIC RH(D): CPT | Performed by: NURSE PRACTITIONER

## 2024-05-19 PROCEDURE — 73564 X-RAY EXAM KNEE 4 OR MORE: CPT | Mod: RT

## 2024-05-19 PROCEDURE — 73503 X-RAY EXAM HIP UNI 4/> VIEWS: CPT | Mod: RIGHT SIDE | Performed by: STUDENT IN AN ORGANIZED HEALTH CARE EDUCATION/TRAINING PROGRAM

## 2024-05-19 PROCEDURE — 93971 EXTREMITY STUDY: CPT | Performed by: STUDENT IN AN ORGANIZED HEALTH CARE EDUCATION/TRAINING PROGRAM

## 2024-05-19 PROCEDURE — 80053 COMPREHEN METABOLIC PANEL: CPT | Performed by: NURSE PRACTITIONER

## 2024-05-19 RX ORDER — OXYCODONE AND ACETAMINOPHEN 5; 325 MG/1; MG/1
1 TABLET ORAL ONCE
Status: COMPLETED | OUTPATIENT
Start: 2024-05-19 | End: 2024-05-19

## 2024-05-19 RX ADMIN — OXYCODONE HYDROCHLORIDE AND ACETAMINOPHEN 1 TABLET: 5; 325 TABLET ORAL at 18:02

## 2024-05-19 RX ADMIN — IOHEXOL 100 ML: 350 INJECTION, SOLUTION INTRAVENOUS at 20:17

## 2024-05-19 ASSESSMENT — PAIN DESCRIPTION - LOCATION: LOCATION: LEG

## 2024-05-19 ASSESSMENT — PAIN - FUNCTIONAL ASSESSMENT: PAIN_FUNCTIONAL_ASSESSMENT: 0-10

## 2024-05-19 ASSESSMENT — COLUMBIA-SUICIDE SEVERITY RATING SCALE - C-SSRS
1. IN THE PAST MONTH, HAVE YOU WISHED YOU WERE DEAD OR WISHED YOU COULD GO TO SLEEP AND NOT WAKE UP?: NO
2. HAVE YOU ACTUALLY HAD ANY THOUGHTS OF KILLING YOURSELF?: NO
6. HAVE YOU EVER DONE ANYTHING, STARTED TO DO ANYTHING, OR PREPARED TO DO ANYTHING TO END YOUR LIFE?: NO

## 2024-05-19 ASSESSMENT — PAIN SCALES - GENERAL: PAINLEVEL_OUTOF10: 5 - MODERATE PAIN

## 2024-05-19 ASSESSMENT — ENCOUNTER SYMPTOMS: NUMBNESS: 1

## 2024-05-19 NOTE — ED TRIAGE NOTES
Patient was on vacation and was horse back riding.  She fell off the horse at that time and she injured her left arm and her right upper leg.  She was initally seen at [facility] and was sent for trauma consult.  Pt has said that she was experiencing the pain in the leg after the accident but was told that they felt this was an exacerbation of her sciatic nerve pain.  Pt was told to come for re-eval since she was a trauma consult.

## 2024-05-19 NOTE — CONSULTS
Reason For Consult  Postop left midshaft humerus ORIF wound check    History Of Present Illness  Jasbir Diamond is a 53 y.o. female presenting with above-mentioned.  Status post ORIF left midshaft humerus on .  Presenting for some vague pain in the right lower extremity.  Consulted for wound evaluation.  No drainage noted.  Denies fevers or chills.  No numbness or tingling left upper extremity.        Past medical history: Healthy  Past surgical history: per HPI, rest reviewed in EMR  Allergies: Reviewed in EMR  Medications: Reviewed in EMR  Social History: Denies smoking illicit drug use  Family History:  Non-contributory to this patient's acute surgical issue.    Review of Systems: 12 point ROS negative unless stated in HPI    Past Medical History  She has a past medical history of Other conditions influencing health status.    Surgical History  She has a past surgical history that includes  section, classic (2017); Cervical biopsy w/ loop electrode excision (2017); Other surgical history (2017); and Dilation and curettage of uterus (2017).     Social History  She reports that she has been smoking cigarettes. She has never used smokeless tobacco. She reports that she does not currently use alcohol. She reports that she does not currently use drugs after having used the following drugs: Marijuana.    Family History  No family history on file.     Allergies  Penicillins and Codeine    Review of Systems  Review of Systems     Physical Exam  Constitutional: NAD, resting comfortably in bed  Skin: Warm and dry, no rashes   Eyes: EOMI, clear sclera   ENMT: MMM   HEENT: Neck supple without apparent injury, EOMI, MMM  Respiratory: NWOB on RA   CV: RRR per peripheral pulses, limbs wwp  GI: soft, non-distended   Lymph: No apparent LAD  Neuro: BURGESS spontaneously, CNs II - XII grossly intact   Psych: Appropriate mood and behavior   MSK:   -- Left upper extremity.  Sling in  place.  --Well-healing anterior lateral surgical incision.  No drainage.  No surrounding erythema.  --SILT m/r/u/ax  --Fires ain/pin/m/r/u (AIN/PIN/radial/ulnar innervated muscles intact (FPL, EPL, wrist ext, FDP to SF, finger ab/adduction))  --2+ radial pulse  --WWP fingers        Last Recorded Vitals  Blood pressure 130/86, pulse 100, temperature 36.8 °C (98.2 °F), temperature source Temporal, resp. rate 17, height 1.524 m (5'), weight 73.5 kg (162 lb), SpO2 98%.    Relevant Results      Scheduled medications    Continuous medications    PRN medications    Results for orders placed or performed during the hospital encounter of 05/19/24 (from the past 24 hour(s))   CBC and Auto Differential   Result Value Ref Range    WBC 13.4 (H) 4.4 - 11.3 x10*3/uL    nRBC 0.0 0.0 - 0.0 /100 WBCs    RBC 4.72 4.00 - 5.20 x10*6/uL    Hemoglobin 14.5 12.0 - 16.0 g/dL    Hematocrit 42.1 36.0 - 46.0 %    MCV 89 80 - 100 fL    MCH 30.7 26.0 - 34.0 pg    MCHC 34.4 32.0 - 36.0 g/dL    RDW 12.3 11.5 - 14.5 %    Platelets 405 150 - 450 x10*3/uL    Neutrophils % 63.6 40.0 - 80.0 %    Immature Granulocytes %, Automated 0.4 0.0 - 0.9 %    Lymphocytes % 27.9 13.0 - 44.0 %    Monocytes % 5.3 2.0 - 10.0 %    Eosinophils % 2.2 0.0 - 6.0 %    Basophils % 0.6 0.0 - 2.0 %    Neutrophils Absolute 8.54 (H) 1.20 - 7.70 x10*3/uL    Immature Granulocytes Absolute, Automated 0.05 0.00 - 0.70 x10*3/uL    Lymphocytes Absolute 3.74 1.20 - 4.80 x10*3/uL    Monocytes Absolute 0.71 0.10 - 1.00 x10*3/uL    Eosinophils Absolute 0.30 0.00 - 0.70 x10*3/uL    Basophils Absolute 0.08 0.00 - 0.10 x10*3/uL   Comprehensive metabolic panel   Result Value Ref Range    Glucose 85 74 - 99 mg/dL    Sodium 139 136 - 145 mmol/L    Potassium 4.2 3.5 - 5.3 mmol/L    Chloride 101 98 - 107 mmol/L    Bicarbonate 26 21 - 32 mmol/L    Anion Gap 16 10 - 20 mmol/L    Urea Nitrogen 17 6 - 23 mg/dL    Creatinine 0.58 0.50 - 1.05 mg/dL    eGFR >90 >60 mL/min/1.73m*2    Calcium 9.8 8.6 -  10.6 mg/dL    Albumin 4.6 3.4 - 5.0 g/dL    Alkaline Phosphatase 92 33 - 110 U/L    Total Protein 8.1 6.4 - 8.2 g/dL    AST 28 9 - 39 U/L    Bilirubin, Total 0.4 0.0 - 1.2 mg/dL    ALT 30 7 - 45 U/L   Coagulation Screen   Result Value Ref Range    Protime 10.8 9.8 - 12.8 seconds    INR 1.0 0.9 - 1.1    aPTT 34 27 - 38 seconds   Sedimentation rate, automated   Result Value Ref Range    Sedimentation Rate 43 (H) 0 - 30 mm/h   C-reactive protein   Result Value Ref Range    C-Reactive Protein 1.09 (H) <1.00 mg/dL        Assessment/Plan   53 F (healthy) status post left midshaft humerus ORIF on 5/7.  Presenting for right lower extremity numbness and tingling.  Orthopedics was engaged for wound evaluation.  Wound is pristine.  No drainage.  No erythema.  Staples in place.    Plan:  - No acute orthopaedic surgical intervention  -Regular scheduled follow-up with Randa Garcia PA-C on Wednesday 5/22  -Continue coffee cup weightbearing, sling for comfort      Patient was seen within 30 minutes of consulting placed.    Consult will be discussed with attending Dr. Grissom.      Andrea Kendall, DO PGY-III  Orthopaedic Surgery   Pager: [phone]  Doc Presley Fuentes
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
## 2024-05-19 NOTE — CONSULTS
[facility] Department of Orthopaedic Surgery   Initial Consult Note  05/19/24    HPI:   Orthopaedic Problems/Injuries: concern for left upper extremity surgical site infection     53F RHD (COPD, Smoker) s/p L Humerus ORIF w/ Dr. Grissom 5/7 p/w 3 days worsening RLE pain after running out of Flexeril. Ortho consult c/f SSI LUE. Patient denies worsening pain in left upper extremity. Denies discharge, fever or chills, numbness or tingling.     Allergies: Penicillin, Codeine  Medications: Oxy - rest per EMR  Anticoagulation: ASA  Social History: denies smoking, denies drinking, denies IVDU  Ambulatory status: without assistive devices at baseline   Family History:  Non-contributory to this patient's acute surgical issue.    ROS  12-point review of systems is negative other than what is mentioned above.    Physical Exam:  Gen: AOx3, NAD  HEENT: normocephalic atraumatic  Psych: appropriate mood and affect  Resp: nonlabored breathing  Cardiac: Extremities WWP, RRR to peripheral palpation  Neuro: CN 2-12 grossly intact  Skin: no rashes  MSK:   LUE:  - staples, surgical incision healing appropriately without surrounding erythema or fluctuance  - Mild swelling, no ecchymosis or palpable fluid collection   - Tender at site of injury with painful ROM.  - Motor intact in axillary/AIN/PIN/ulnar distributions  - SILT axillary/radial/median/ulnar distributions  - Hand wwp, 2+ radial pulse, cap refill brisk  - Compartments soft and compressible, no pain with passive stretch of digits      A full secondary exam was performed and all relevant findings discussed and noted above.    Imaging:  XR LEFT HUMERUS: demonstrates stable hardware.    Assessment:  Orthopaedic Problems/Injuries: c/f SSI LUE    53F RHD (COPD, Smoker) s/p L Humerus ORIF w/ Dr. Grissom 5/7 p/w 3 days worsening RLE pain after running out of Flexeril. Ortho consult c/f SSI LUE. Jessica, incision healing appropriately, no erythema or fluctuance. XR w/ stable  hardware. ESR    Plan:  - No acute intervention indicated at this time  - Patient should follow up with Randa Garcia PA-C at her scheduled appointment 5/22  - Continue ASA 81 mg BID for DVT ppx     This patient was seen within 30 minutes of initial consult and staffed with attending physician Dr. Girssom.     Oscar Vargas MD  PGY-1 Orthopaedic Surgery  On-call Resident  Epic Chat Preferred  _________________________________________________________    This patient will be followed by the ORTHO TRAUMA service while in-house. Please contact the following team members for any additional questions/concerns.     Ortho Trauma  Angelo Mayorga MD PGY1   Thomas Larios MD PGY2  Radha Graves MD PGY3     Please page [phone] (ortho on-call) after 6pm and on weekends.

## 2024-05-20 NOTE — CONSULTS
Reason For Consult  High grade stenosis of R AGUEDA    History Of Present Illness  Jasbir Diamond is a 53 y.o. female presenting to the ED for evaluation of right lower extremity tingling and numbness. Patient was thrown from a horse on  and broke her L humerus, for which she recently underwent ORIF with ortho. Also endorses tingling of her R anterior and medial thigh with intermittent stabbing pains as well as numbness radiating from R thigh down leg while ambulating. The numbness interferes with her ability to ambulate long distances. Patient reports tingling and numbness have improved a little but are still bothersome to her, so she came to the ED for evaluation. She has never had these symptoms prior to her accident on . Denies weakness, rest pain, claudication. CTA was obtained because of tingling with significant finding of high grade stenosis of R common iliac artery, for which vascular surgery is consulted.     Past Medical History  COPD  Seasonal allergies    Surgical History    D&C  Partial vulvectomy  ORIF L humerus     Social History  She reports that she has been smoking cigarettes. She has never used smokeless tobacco. She reports that she does not currently use alcohol. She reports that she does not currently use drugs after having used the following drugs: Marijuana.    Family History  Noncontributory     Allergies  Penicillins and Codeine    Review of Systems  12-point ROS negative except per HPI     Physical Exam  General: sitting comfotably, NAD  HEENT: normocephalic  CV: regular rate on monitor  Pulm: nonlabored on RA  Abd: soft, nt, nd  Ext: LUE in sling, staples in place, no drainage  Peripheral Vascular: L fem/pop/PT/DP multiphasic, R fem multiphasic, R pop/PT/DP monophasic  Neuro: BLE grossly , R thigh sensation decreased compared to L  Skin: skin diffusely cool to touch, no difference in temperature between lower extremities, no skin changes or pallor     Last Recorded  Vitals  Blood pressure 130/86, pulse 100, temperature 36.8 °C (98.2 °F), temperature source Temporal, resp. rate 17, height 1.524 m (5'), weight 73.5 kg (162 lb), SpO2 98%.    Relevant Results  Results for orders placed or performed during the hospital encounter of 05/19/24 (from the past 24 hour(s))   CBC and Auto Differential   Result Value Ref Range    WBC 13.4 (H) 4.4 - 11.3 x10*3/uL    nRBC 0.0 0.0 - 0.0 /100 WBCs    RBC 4.72 4.00 - 5.20 x10*6/uL    Hemoglobin 14.5 12.0 - 16.0 g/dL    Hematocrit 42.1 36.0 - 46.0 %    MCV 89 80 - 100 fL    MCH 30.7 26.0 - 34.0 pg    MCHC 34.4 32.0 - 36.0 g/dL    RDW 12.3 11.5 - 14.5 %    Platelets 405 150 - 450 x10*3/uL    Neutrophils % 63.6 40.0 - 80.0 %    Immature Granulocytes %, Automated 0.4 0.0 - 0.9 %    Lymphocytes % 27.9 13.0 - 44.0 %    Monocytes % 5.3 2.0 - 10.0 %    Eosinophils % 2.2 0.0 - 6.0 %    Basophils % 0.6 0.0 - 2.0 %    Neutrophils Absolute 8.54 (H) 1.20 - 7.70 x10*3/uL    Immature Granulocytes Absolute, Automated 0.05 0.00 - 0.70 x10*3/uL    Lymphocytes Absolute 3.74 1.20 - 4.80 x10*3/uL    Monocytes Absolute 0.71 0.10 - 1.00 x10*3/uL    Eosinophils Absolute 0.30 0.00 - 0.70 x10*3/uL    Basophils Absolute 0.08 0.00 - 0.10 x10*3/uL   Comprehensive metabolic panel   Result Value Ref Range    Glucose 85 74 - 99 mg/dL    Sodium 139 136 - 145 mmol/L    Potassium 4.2 3.5 - 5.3 mmol/L    Chloride 101 98 - 107 mmol/L    Bicarbonate 26 21 - 32 mmol/L    Anion Gap 16 10 - 20 mmol/L    Urea Nitrogen 17 6 - 23 mg/dL    Creatinine 0.58 0.50 - 1.05 mg/dL    eGFR >90 >60 mL/min/1.73m*2    Calcium 9.8 8.6 - 10.6 mg/dL    Albumin 4.6 3.4 - 5.0 g/dL    Alkaline Phosphatase 92 33 - 110 U/L    Total Protein 8.1 6.4 - 8.2 g/dL    AST 28 9 - 39 U/L    Bilirubin, Total 0.4 0.0 - 1.2 mg/dL    ALT 30 7 - 45 U/L   Coagulation Screen   Result Value Ref Range    Protime 10.8 9.8 - 12.8 seconds    INR 1.0 0.9 - 1.1    aPTT 34 27 - 38 seconds   Type and Screen   Result Value Ref Range     ABO TYPE O     Rh TYPE NEG     ANTIBODY SCREEN NEG    Sedimentation rate, automated   Result Value Ref Range    Sedimentation Rate 43 (H) 0 - 30 mm/h   C-reactive protein   Result Value Ref Range    C-Reactive Protein 1.09 (H) <1.00 mg/dL      CT angio aorta and bilateral iliofemoral runoff w and or wo IV contrast    Result Date: 5/19/2024  STUDY: CT ANGIO AORTA AND BILATERAL ILIOFEMORAL RUNOFF W AND OR WO IV CONTRAST;  5/19/2024 8:16 pm   INDICATION: Signs/Symptoms:right lower extremity numbness/tingling, r/o limb ischemia.   COMPARISON: CT abdomen pelvis 08/29/2017   ACCESSION NUMBER(S): [id number]   ORDERING CLINICIAN: BRUCE VIEYRA   TECHNIQUE: Thin-section axial images of the abdomen, pelvis and bilateral lower extremities were obtained in the arterial phase after intravenous administration of 75 mL Omnipaque 350 contrast. Delayed images the legs were obtained for assessment of venous patency. Coronal and sagittal reformatted images were reconstructed from the axial data. Multiplanar MIPs and 3D reconstructions were created on an independent workstation and reviewed.   FINDINGS: VASCULATURE:   ABDOMINAL AORTA: No abdominal aortic aneurysm or dissection. Moderate aortic atherosclerosis results in at least moderate narrowing at the bifurcation.   ABDOMINAL ARTERIES: The celiac axis, superior mesenteric artery, and inferior mesenteric artery are patent. A single right renal artery is noted, which is patent. A single left renal artery is noted, which is patent.   RIGHT LOWER EXTREMITY:   - Right Common Iliac Artery: High-grade stenosis due to calcified and noncalcified atherosclerosis noted along virtually the whole length of the right common iliac artery measuring up to 4.2 cm in length. - Right External Iliac Artery: Mild atherosclerosis. No hemodynamically significant stenosis. - Right Internal Iliac Artery:  No hemodynamically significant stenosis.   - Right Common Femoral Artery:  No hemodynamically  significant stenosis. - Right Profunda Artery:  No hemodynamically significant stenosis. - Right Superficial Femoral Artery:  Mild atherosclerosis at its proximal segment without hemodynamically significant stenosis. - Right Popliteal Artery:  No hemodynamically significant stenosis. - Right Anterior Tibial, Posterior Tibial, Peroneal Arteries:  No hemodynamically significant stenosis.     LEFT LOWER EXTREMITY:   - Left Common Iliac Artery:  Calcified and noncalcified atherosclerosis results in at least mild stenosis. - Left External Iliac Artery:  No hemodynamically significant stenosis. - Left Internal Iliac Artery: Mild atherosclerosis without hemodynamically significant stenosis.   - Left Common Femoral Artery:  No hemodynamically significant stenosis. - Left Profunda Artery:  No hemodynamically significant stenosis. - Left Superficial Femoral Artery:  No hemodynamically significant stenosis. - Left Popliteal Artery:  No hemodynamically significant stenosis. - Left Anterior Tibial, Posterior Tibial, Peroneal Arteries:  No hemodynamically significant stenosis.     ABDOMEN/PELVIS:   LIVER: No significant parenchymal abnormality.   BILE DUCTS: No significant intrahepatic or extrahepatic dilatation.   GALLBLADDER: No significant abnormality.   SPLEEN: No significant abnormality.   PANCREAS: No significant abnormality.   ADRENALS: No significant abnormality.   KIDNEYS, URETERS, BLADDER: The kidneys are symmetric in size and enhancement. Multiple bilateral simple cysts are noted measuring up to 3.1 cm at the right inferior renal pole. Additional parapelvic cysts are noted on the left. No hydroureteronephrosis or nephroureterolithiasis. The urinary bladder is within normal limits for degree of distention.   REPRODUCTIVE ORGANS: No significant abnormality.   VESSELS: See above. No additional significant abnormality.   RETROPERITONEUM/LYMPH NODES: No enlarged lymph nodes.   BOWEL/PERITONEUM: The stomach is within normal  limits. No inflammatory bowel wall thickening or dilatation. Normal appendix.   No pneumoperitoneum, significant ascites, or abscess.     ABDOMINAL WALL: Small fat containing umbilical hernia.   MUSCULOSKELETAL:  No acute osseous abnormality. Multilevel degenerative changes of the thoracolumbar spine are noted. A trace left knee effusion is noted.   LOWER CHEST: No acute abnormality involving the lung bases. Bibasilar atelectasis. Emphysematous changes are noted within the partially visualized lungs.       1. High-grade stenosis involving the right common iliac artery extending from its origin to its most distal aspect measuring up to 4.2 cm in length. The right lower extremity arteries are otherwise patent distally with three-vessel runoff at the ankle. 2. Moderate atherosclerosis of the abdominal aorta with at least moderate narrowing at the aortic bifurcation. No aneurysm or dissection. 3. Additional findings as above.   I personally reviewed the images/study and I agree with the findings as stated by Delaney Martinez MD. This study was interpreted at [place], [place].   MACRO: None.     Dictation workstation:   [id number]    Lower extremity venous duplex right    Result Date: 5/19/2024  Interpreted By:  Lowell Malin and Weaver Jakob STUDY: [facility] LOWER EXTREMITY VENOUS DUPLEX RIGHT;  5/19/2024 5:50 pm   INDICATION: Signs/Symptoms:rle pain, recent surgery and recent travel.   COMPARISON: None.   ACCESSION NUMBER(S): [id number]   ORDERING CLINICIAN: BRUCE VIEYRA   TECHNIQUE: Vascular ultrasound of the right lower extremity was performed. Real-time compression views as well as Gray scale, color Doppler and spectral Doppler waveform analysis was performed.   FINDINGS: Evaluation of the visualized portions of the right common femoral vein, proximal, mid, and distal femoral vein, and popliteal vein were performed.  Evaluation of the visualized portions of the   posterior tibial and peroneal veins were also performed.   The evaluated veins demonstrate normal compressibility. There is intact venous flow demonstrating normal respiratory variability and normal augmentation of flow with calf compression.       No sonographic evidence for deep vein thrombosis within the evaluated veins of the right lower extremity.   I personally reviewed the images/study and I agree with the findings as stated by Nick Prieto MD. This study was interpreted at [place], [place].   MACRO: None   Signed by: Lowell Malin 5/19/2024 6:12 PM Dictation workstation:   [id number]    XR humerus left    Result Date: 5/19/2024  Interpreted By:  Brendan Valentine,  and Mckenzie Alarcon STUDY: XR HUMERUS LEFT; ;  5/19/2024 4:54 pm   INDICATION: Signs/Symptoms:s/p surgery, now with swelling around the staples.   COMPARISON: X-ray left humerus 05/06/2024   ACCESSION NUMBER(S): [id number]   ORDERING CLINICIAN: BRUCE VIEYRA   FINDINGS: Two views of the left humerus are provided.   Postsurgical changes of ORIF of the comminuted mid diaphyseal fracture with plates and screws. Hardware appears intact without perihardware lucency or fracture. No evidence of acute traumatic injury. Left elbow joint is maintained.       ORIF of a left mid shaft humerus fracture without hardware complication.   I personally reviewed the image(s)/study and resident interpretation. I agree with the findings as stated by resident Dorian Rincon. Data analyzed and images interpreted at [facility], [place], [place].   MACRO: None   Signed by: Brendan Valentine 5/19/2024 5:13 PM Dictation workstation:   [id number]    XR hip right with pelvis when performed 4+ views    Result Date: 5/19/2024  Interpreted By:  Brendan Valentine,  Hipolito Alarcon STUDY: XR FEMUR RIGHT 2+ VIEWS; XR KNEE RIGHT 4+ VIEWS; XR HIP RIGHT WITH PELVIS WHEN PERFORMED 4+ VIEWS; ;  5/19/2024  4:54 pm   INDICATION: Signs/Symptoms:right leg pain; Signs/Symptoms:right knee pain; Signs/Symptoms:right leg pain, fell off a horse.   COMPARISON: None.   ACCESSION NUMBER(S): [id number]; [id number]; [id number]   ORDERING CLINICIAN: BRUCE VIEYRA   FINDINGS: No acute fracture or malalignment. The pelvic ring is maintained. Mild bilateral hip osteoarthrosis. Mild tricompartmental osteoarthrosis of the right knee. The soft tissues are unremarkable.       Mild articular osteoarthrosis. No acute fracture or dislocation.   MACRO: None   Signed by: Brendan Valentine 5/19/2024 5:12 PM Dictation workstation:   [id number]    XR femur right 2+ views    Result Date: 5/19/2024  Interpreted By:  Brendan Valentine and Beyersdorf Conner STUDY: XR FEMUR RIGHT 2+ VIEWS; XR KNEE RIGHT 4+ VIEWS; XR HIP RIGHT WITH PELVIS WHEN PERFORMED 4+ VIEWS; ;  5/19/2024 4:54 pm   INDICATION: Signs/Symptoms:right leg pain; Signs/Symptoms:right knee pain; Signs/Symptoms:right leg pain, fell off a horse.   COMPARISON: None.   ACCESSION NUMBER(S): [id number]; [id number]; [id number]   ORDERING CLINICIAN: BRUCE VIEYRA   FINDINGS: No acute fracture or malalignment. The pelvic ring is maintained. Mild bilateral hip osteoarthrosis. Mild tricompartmental osteoarthrosis of the right knee. The soft tissues are unremarkable.       Mild articular osteoarthrosis. No acute fracture or dislocation.   MACRO: None   Signed by: Brendan Valentine 5/19/2024 5:12 PM Dictation workstation:   [id number]    XR knee right 4+ views    Result Date: 5/19/2024  Interpreted By:  Brendan Valentine and Beyersdorf Conner STUDY: XR FEMUR RIGHT 2+ VIEWS; XR KNEE RIGHT 4+ VIEWS; XR HIP RIGHT WITH PELVIS WHEN PERFORMED 4+ VIEWS; ;  5/19/2024 4:54 pm   INDICATION: Signs/Symptoms:right leg pain; Signs/Symptoms:right knee pain; Signs/Symptoms:right leg pain, fell off a horse.   COMPARISON: None.   ACCESSION NUMBER(S): [id number]; [id number]; [id number]   ORDERING CLINICIAN: BRUCE  VIEYRA   FINDINGS: No acute fracture or malalignment. The pelvic ring is maintained. Mild bilateral hip osteoarthrosis. Mild tricompartmental osteoarthrosis of the right knee. The soft tissues are unremarkable.       Mild articular osteoarthrosis. No acute fracture or dislocation.   MACRO: None   Signed by: Brendan Valentine 5/19/2024 5:12 PM Dictation workstation:   [id number]       Assessment/Plan     53 y.o. female presenting to the ED for evaluation of right lower extremity tingling and numbness since being thrown from a horse on 5/5. She denies lower extremity weakness, rest pain, claudication. CTA was obtained because of tingling with significant finding of high grade stenosis of R common iliac artery, but 3 vessel runoff is present in RLE. R DP/PT are monophasic compared to multiphasic L DP/PT, but signals are present throughout RLE. No weakness present.    Recommendations:    - No acute surgical intervention  - Outpatient follow up with vascular surgery including ABIs/PVRs; vascular surgery will email for follow up      Discussed with vascular chief Dr. Easton. Staffed with attending Dr. Baer.    Elana García MD  Vasc Surg

## 2024-05-22 ENCOUNTER — HOSPITAL ENCOUNTER (OUTPATIENT)
Dept: RADIOLOGY | Facility: CLINIC | Age: 54
Discharge: HOME | End: 2024-05-22
Payer: MEDICARE

## 2024-05-22 ENCOUNTER — OFFICE VISIT (OUTPATIENT)
Dept: ORTHOPEDIC SURGERY | Facility: CLINIC | Age: 54
End: 2024-05-22
Payer: MEDICARE

## 2024-05-22 DIAGNOSIS — S42.352A DISPLACED COMMINUTED FRACTURE OF SHAFT OF HUMERUS, LEFT ARM, INITIAL ENCOUNTER FOR CLOSED FRACTURE: ICD-10-CM

## 2024-05-22 DIAGNOSIS — S42.352A DISPLACED COMMINUTED FRACTURE OF SHAFT OF HUMERUS, LEFT ARM, INITIAL ENCOUNTER FOR CLOSED FRACTURE: Primary | ICD-10-CM

## 2024-05-22 DIAGNOSIS — S42.302S CLOSED FRACTURE OF SHAFT OF LEFT HUMERUS, UNSPECIFIED FRACTURE MORPHOLOGY, SEQUELA: ICD-10-CM

## 2024-05-22 PROCEDURE — 99024 POSTOP FOLLOW-UP VISIT: CPT | Performed by: PHYSICIAN ASSISTANT

## 2024-05-22 RX ORDER — OXYCODONE HYDROCHLORIDE 5 MG/1
5 TABLET ORAL EVERY 6 HOURS
Qty: 28 TABLET | Refills: 0 | Status: SHIPPED | OUTPATIENT
Start: 2024-05-22 | End: 2024-05-29

## 2024-05-22 RX ORDER — CYCLOBENZAPRINE HCL 10 MG
10 TABLET ORAL 3 TIMES DAILY PRN
Qty: 21 TABLET | Refills: 0 | Status: SHIPPED | OUTPATIENT
Start: 2024-05-22 | End: 2024-05-29

## 2024-05-22 RX ORDER — TRAZODONE HYDROCHLORIDE 50 MG/1
50 TABLET ORAL NIGHTLY
Qty: 30 TABLET | Refills: 0 | Status: SHIPPED | OUTPATIENT
Start: 2024-05-22 | End: 2024-06-21

## 2024-05-22 ASSESSMENT — PAIN SCALES - GENERAL: PAINLEVEL_OUTOF10: 4

## 2024-05-22 ASSESSMENT — PAIN - FUNCTIONAL ASSESSMENT: PAIN_FUNCTIONAL_ASSESSMENT: 0-10

## 2024-05-22 ASSESSMENT — PAIN DESCRIPTION - DESCRIPTORS: DESCRIPTORS: ACHING
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
## 2024-06-03 ENCOUNTER — EVALUATION (OUTPATIENT)
Dept: PHYSICAL THERAPY | Facility: CLINIC | Age: 54
End: 2024-06-03
Payer: MEDICARE

## 2024-06-03 DIAGNOSIS — S42.352A DISPLACED COMMINUTED FRACTURE OF SHAFT OF HUMERUS, LEFT ARM, INITIAL ENCOUNTER FOR CLOSED FRACTURE: Primary | ICD-10-CM

## 2024-06-03 DIAGNOSIS — S42.302A CLOSED FRACTURE OF SHAFT OF LEFT HUMERUS: ICD-10-CM

## 2024-06-03 PROCEDURE — 97161 PT EVAL LOW COMPLEX 20 MIN: CPT | Mod: GP

## 2024-06-03 PROCEDURE — 97110 THERAPEUTIC EXERCISES: CPT | Mod: GP

## 2024-06-03 ASSESSMENT — ENCOUNTER SYMPTOMS
DEPRESSION: 0
OCCASIONAL FEELINGS OF UNSTEADINESS: 0
LOSS OF SENSATION IN FEET: 0

## 2024-06-03 ASSESSMENT — PAIN - FUNCTIONAL ASSESSMENT: PAIN_FUNCTIONAL_ASSESSMENT: 0-10

## 2024-06-03 ASSESSMENT — PAIN SCALES - GENERAL: PAINLEVEL_OUTOF10: 9

## 2024-06-03 ASSESSMENT — PAIN DESCRIPTION - DESCRIPTORS: DESCRIPTORS: ACHING

## 2024-06-03 NOTE — PROGRESS NOTES
Physical Therapy Evaluation and Treatment      Patient Name: Jasbir Diamond  MRN: [id number]  Today's Date: 6/3/2024  Time Calculation  Start Time: 1415  Stop Time: 1500  Time Calculation (min): 45 min  PT Therapeutic Procedures Time Entry  Therapeutic Exercise Time Entry: 15    Low complexity due to patient's clinical presentation being stable and uncomplicated by any significant comorbidities that may affect rehab tolerance and progression.    Insurance:  Visit number: 1 of 17  Authorization info: 20V/YR  Insurance Type: Payor: [employer] / Plan: [employer] / Product Type: *No Product type* /     Current Problem:   1. Displaced comminuted fracture of shaft of humerus, left arm, initial encounter for closed fracture  Referral to Physical Therapy    Follow Up In Physical Therapy      2. Closed fracture of shaft of left humerus            Subjective    Pt thrown from a horse while vacationing in [place] 5/3/2024.  Fractured  left humerus midshaft.  Returned home and had ORIF  5/7/2024.  Pt currently wearing sling. Reports difficulty sleeping due to pain.    Pt is right handed and works as a .  Pt lives with daughter.  Daughter currently assist pt with ADLs    General  Reason for Referral: ORIF Closed fracture of shaft of left humerus  Referred By: Randa Garcia PA-C  Past Medical History Relevant to Rehab: COPD, CA, OA    Precautions  STEADI Fall Risk Score (The score of 4 or more indicates an increased risk of falling): 0  Post-Surgical Precautions: Left shoulder precautions    Pain Assessment  Pain Assessment: 0-10  Pain Score: 9 (at worst)  Pain Location: Shoulder  Pain Orientation: Left  Pain Descriptors: Aching  Pain Frequency: Intermittent  Patient's Stated Pain Goal: No pain    Objective   ROM                   Right(AROM)    Left(PROM)    Sh flex  150deg               105deg         Abd 154deg                n/t         Ext     75deg                n/t         ER      WFL                    0deg    MMT     Right 5/5 throughout.  Left not tested    Special Tests    Incision is well-healing; no s/s of infection.      Outcome Measures:  Other: UEFI  29/80    Treatments:  Therapeutic Exercise:   Pt instructed in PROM for left shoulder flex and ER, scap retraction.     Assessment   PT Assessment Results: Decreased strength, Decreased range of motion, Orthopedic restrictions, Pain  Rehab Prognosis: Good  Evaluation/Treatment Tolerance: Patient limited by pain  Assessment Comment: Pt is a 52 y/o female s/P ORIF left humerus fracture.  Pt presents with loss of ROM and strength consistent with recent surgery.  Pt would benefit from continued skilled PT to regain strength and ROM in order to return to PLOF.  Low complexity due to patient's clinical presentation being stable and uncomplicated by any significant comorbidities that may affect rehab tolerance and progression.     Plan:   Treatment/Interventions: Cryotherapy, Education/ Instruction, Hot pack, Manual therapy, Therapeutic activities, Therapeutic exercises, Electrical stimulation  PT Plan: Skilled PT  PT Frequency: 2 times per week  Duration: 8 weeks or 16 more visits  Onset Date: 06/03/24  Number of Treatments Authorized: 20/year  Rehab Potential: Good  Plan of Care Agreement: Patient      Goals:   Active       PT Problem       PT Goal 1       Start:  06/03/24    Expected End:  07/03/24       Pt independent with HEP         PT Goal 2       Start:  06/03/24    Expected End:  07/03/24       Increase PROM left shoulder flex to at least 140deg and ER to at least 40 deg to allow pt to perform ADLs without difficulty         PT Goal 3       Start:  06/03/24    Expected End:  09/01/24       Improve UEFI to >60/80         PT Goal 4       Start:  06/03/24    Expected End:  09/01/24       Increase strength right shoulder to at least 4+/5 throughout to allow pt to return to work         Patient Stated Goal 1       Start:  06/03/24    Expected End:   07/03/24       Pt able to sleep at least 6 hours uninterrupted by pain

## 2024-06-07 ENCOUNTER — TELEPHONE (OUTPATIENT)
Dept: VASCULAR SURGERY | Facility: HOSPITAL | Age: 54
End: 2024-06-07
Payer: MEDICARE

## 2024-06-07 NOTE — TELEPHONE ENCOUNTER
This patient left a message that she has a new pt appt with Guera Lomas 6/21 and would like to get in sooner for her numbness is knee upper thigh  that is getting worse and tighter feeling like a hernandez horse. I called patient back and left a message that we are unable to get her in any sooner for her appt and US and advised her if symptoms continue to worsen to head to ED.

## 2024-06-13 DIAGNOSIS — S42.352A DISPLACED COMMINUTED FRACTURE OF SHAFT OF HUMERUS, LEFT ARM, INITIAL ENCOUNTER FOR CLOSED FRACTURE: ICD-10-CM

## 2024-06-13 RX ORDER — TRAZODONE HYDROCHLORIDE 50 MG/1
50 TABLET ORAL NIGHTLY
Qty: 30 TABLET | Refills: 0 | Status: SHIPPED | OUTPATIENT
Start: 2024-06-13 | End: 2024-07-13

## 2024-06-14 DIAGNOSIS — S42.352A DISPLACED COMMINUTED FRACTURE OF SHAFT OF HUMERUS, LEFT ARM, INITIAL ENCOUNTER FOR CLOSED FRACTURE: Primary | ICD-10-CM

## 2024-06-14 RX ORDER — OXYCODONE AND ACETAMINOPHEN 5; 325 MG/1; MG/1
1 TABLET ORAL EVERY 8 HOURS PRN
Qty: 21 TABLET | Refills: 0 | Status: SHIPPED | OUTPATIENT
Start: 2024-06-14 | End: 2024-06-21

## 2024-06-14 NOTE — TELEPHONE ENCOUNTER
Pt called for pain medicine. DOS 5/7/24. Rates pain a 6/10. I have personally reviewed the OARRS report for the patient, no issues identified. This report is scanned into the EMR. I have considered the risks of abuse, dependence, addiction, and diversion. The 7 day Rx sent to pharmacy on file. AMY DEGROOT

## 2024-06-17 DIAGNOSIS — S42.302S CLOSED FRACTURE OF SHAFT OF LEFT HUMERUS, UNSPECIFIED FRACTURE MORPHOLOGY, SEQUELA: ICD-10-CM

## 2024-06-17 RX ORDER — CYCLOBENZAPRINE HCL 10 MG
10 TABLET ORAL 3 TIMES DAILY PRN
Qty: 21 TABLET | Refills: 0 | Status: SHIPPED | OUTPATIENT
Start: 2024-06-17 | End: 2024-06-24

## 2024-06-19 ENCOUNTER — OFFICE VISIT (OUTPATIENT)
Dept: ORTHOPEDIC SURGERY | Facility: CLINIC | Age: 54
End: 2024-06-19
Payer: MEDICARE

## 2024-06-19 ENCOUNTER — HOSPITAL ENCOUNTER (OUTPATIENT)
Dept: RADIOLOGY | Facility: CLINIC | Age: 54
Discharge: HOME | End: 2024-06-19
Payer: MEDICARE

## 2024-06-19 VITALS — BODY MASS INDEX: 31.8 KG/M2 | WEIGHT: 162 LBS | HEIGHT: 60 IN

## 2024-06-19 DIAGNOSIS — S42.352A DISPLACED COMMINUTED FRACTURE OF SHAFT OF HUMERUS, LEFT ARM, INITIAL ENCOUNTER FOR CLOSED FRACTURE: ICD-10-CM

## 2024-06-19 DIAGNOSIS — S42.352A DISPLACED COMMINUTED FRACTURE OF SHAFT OF HUMERUS, LEFT ARM, INITIAL ENCOUNTER FOR CLOSED FRACTURE: Primary | ICD-10-CM

## 2024-06-19 PROCEDURE — 73060 X-RAY EXAM OF HUMERUS: CPT | Mod: LT

## 2024-06-19 PROCEDURE — 99024 POSTOP FOLLOW-UP VISIT: CPT | Performed by: PHYSICIAN ASSISTANT

## 2024-06-19 RX ORDER — GABAPENTIN 100 MG/1
100 CAPSULE ORAL 3 TIMES DAILY
Qty: 90 CAPSULE | Refills: 11 | Status: SHIPPED | OUTPATIENT
Start: 2024-06-19 | End: 2025-06-19

## 2024-06-19 ASSESSMENT — PAIN - FUNCTIONAL ASSESSMENT: PAIN_FUNCTIONAL_ASSESSMENT: 0-10

## 2024-06-19 ASSESSMENT — PAIN DESCRIPTION - DESCRIPTORS: DESCRIPTORS: ACHING;SORE

## 2024-06-19 ASSESSMENT — PAIN SCALES - GENERAL: PAINLEVEL_OUTOF10: 5 - MODERATE PAIN
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
## 2024-06-20 ENCOUNTER — TREATMENT (OUTPATIENT)
Dept: PHYSICAL THERAPY | Facility: CLINIC | Age: 54
End: 2024-06-20
Payer: MEDICARE

## 2024-06-20 DIAGNOSIS — S42.352A DISPLACED COMMINUTED FRACTURE OF SHAFT OF HUMERUS, LEFT ARM, INITIAL ENCOUNTER FOR CLOSED FRACTURE: ICD-10-CM

## 2024-06-20 PROCEDURE — 97110 THERAPEUTIC EXERCISES: CPT | Mod: GP,CQ

## 2024-06-20 NOTE — PROGRESS NOTES
Physical Therapy Treatment    Patient Name: Jasbir Diamond  MRN: [id number]  Today's Date: 6/20/2024  Time Calculation  Start Time: 1550  Stop Time: 1620  Time Calculation (min): 30 min  PT Therapeutic Procedures Time Entry  Therapeutic Exercise Time Entry: 30    Insurance:  Visit number: 2 of 17  Authorization info: EVAL ONLY - [employer] - 100% COVERAGE thru 12/31/24 / COPAY WAIVED / OOP $1800 - MET / PT 20V/YR - 0 used / 5/31/24   16 visits 6/6-8/31  Insurance Type: Payor: [employer] / Plan: [employer] / Product Type: *No Product type* /     Current Problem   1. Displaced comminuted fracture of shaft of humerus, left arm, initial encounter for closed fracture  Follow Up In Physical Therapy          Subjective   General    Pt met with MD and was told she needs another 6 weeks of healing. Allowed to lift #5.  Precautions:   L Humerus Fx  Pain    2  Post Treatment Pain Level 2    Objective   L  shoulder flexion to 150 AROM    Treatments:  Therapeutic Exercise:   UBE x 3  Supine flexion 2 x 15  SPO 2 x 15  S/L ER 2 x 15  S/L ABD 2 x 15  Scap add with GTB  Table slides for flexion  Wands for ER      Assessment   Assessment:    Pt tolerated AROM without issue.    Plan:    Progress as able per healing phase.    OP EDUCATION:   Updated HEP    Goals:   Active       PT Problem       PT Goal 1       Start:  06/03/24    Expected End:  07/03/24       Pt independent with HEP         PT Goal 2       Start:  06/03/24    Expected End:  07/03/24       Increase PROM left shoulder flex to at least 140deg and ER to at least 40 deg to allow pt to perform ADLs without difficulty         PT Goal 3       Start:  06/03/24    Expected End:  09/01/24       Improve UEFI to >60/80         PT Goal 4       Start:  06/03/24    Expected End:  09/01/24       Increase strength right shoulder to at least 4+/5 throughout to allow pt to return to work         Patient Stated Goal 1       Start:  06/03/24    Expected End:   07/03/24       Pt able to sleep at least 6 hours uninterrupted by pain

## 2024-06-21 ENCOUNTER — OFFICE VISIT (OUTPATIENT)
Dept: VASCULAR SURGERY | Facility: HOSPITAL | Age: 54
End: 2024-06-21
Payer: MEDICARE

## 2024-06-21 ENCOUNTER — HOSPITAL ENCOUNTER (OUTPATIENT)
Dept: VASCULAR MEDICINE | Facility: HOSPITAL | Age: 54
Discharge: HOME | End: 2024-06-21
Payer: MEDICARE

## 2024-06-21 VITALS
DIASTOLIC BLOOD PRESSURE: 85 MMHG | SYSTOLIC BLOOD PRESSURE: 142 MMHG | BODY MASS INDEX: 32.39 KG/M2 | RESPIRATION RATE: 18 BRPM | HEIGHT: 60 IN | HEART RATE: 79 BPM | WEIGHT: 165 LBS

## 2024-06-21 DIAGNOSIS — I73.9 PAD (PERIPHERAL ARTERY DISEASE) (CMS-HCC): ICD-10-CM

## 2024-06-21 DIAGNOSIS — I73.9 PAD (PERIPHERAL ARTERY DISEASE) (CMS-HCC): Primary | ICD-10-CM

## 2024-06-21 DIAGNOSIS — I77.1: ICD-10-CM

## 2024-06-21 PROCEDURE — 93924 LWR XTR VASC STDY BILAT: CPT

## 2024-06-21 PROCEDURE — 93924 LWR XTR VASC STDY BILAT: CPT | Performed by: INTERNAL MEDICINE

## 2024-06-21 PROCEDURE — 3077F SYST BP >= 140 MM HG: CPT | Performed by: NURSE PRACTITIONER

## 2024-06-21 PROCEDURE — 99215 OFFICE O/P EST HI 40 MIN: CPT | Performed by: NURSE PRACTITIONER

## 2024-06-21 PROCEDURE — 3079F DIAST BP 80-89 MM HG: CPT | Performed by: NURSE PRACTITIONER

## 2024-06-21 PROCEDURE — 4004F PT TOBACCO SCREEN RCVD TLK: CPT | Performed by: NURSE PRACTITIONER

## 2024-06-21 PROCEDURE — 99406 BEHAV CHNG SMOKING 3-10 MIN: CPT | Performed by: NURSE PRACTITIONER

## 2024-06-21 RX ORDER — ALBUTEROL SULFATE 90 UG/1
2 AEROSOL, METERED RESPIRATORY (INHALATION) EVERY 4 HOURS PRN
COMMUNITY

## 2024-06-21 RX ORDER — ATORVASTATIN CALCIUM 40 MG/1
40 TABLET, FILM COATED ORAL DAILY
Qty: 30 TABLET | Refills: 0 | Status: SHIPPED | OUTPATIENT
Start: 2024-06-21 | End: 2024-07-21

## 2024-06-21 ASSESSMENT — ENCOUNTER SYMPTOMS
DEPRESSION: 0
OCCASIONAL FEELINGS OF UNSTEADINESS: 0
LOSS OF SENSATION IN FEET: 0

## 2024-06-24 ENCOUNTER — APPOINTMENT (OUTPATIENT)
Dept: PHYSICAL THERAPY | Facility: CLINIC | Age: 54
End: 2024-06-24
Payer: MEDICARE

## 2024-06-26 ENCOUNTER — APPOINTMENT (OUTPATIENT)
Dept: PHYSICAL THERAPY | Facility: CLINIC | Age: 54
End: 2024-06-26
Payer: MEDICARE

## 2024-07-01 ENCOUNTER — APPOINTMENT (OUTPATIENT)
Dept: PHYSICAL THERAPY | Facility: CLINIC | Age: 54
End: 2024-07-01
Payer: MEDICARE

## 2024-07-02 DIAGNOSIS — S42.352A DISPLACED COMMINUTED FRACTURE OF SHAFT OF HUMERUS, LEFT ARM, INITIAL ENCOUNTER FOR CLOSED FRACTURE: ICD-10-CM

## 2024-07-02 DIAGNOSIS — S42.302S CLOSED FRACTURE OF SHAFT OF LEFT HUMERUS, UNSPECIFIED FRACTURE MORPHOLOGY, SEQUELA: ICD-10-CM

## 2024-07-02 RX ORDER — OXYCODONE AND ACETAMINOPHEN 5; 325 MG/1; MG/1
1 TABLET ORAL EVERY 6 HOURS PRN
Qty: 5 TABLET | Refills: 0 | Status: CANCELLED | OUTPATIENT
Start: 2024-07-02 | End: 2024-07-09

## 2024-07-03 ENCOUNTER — TREATMENT (OUTPATIENT)
Dept: PHYSICAL THERAPY | Facility: CLINIC | Age: 54
End: 2024-07-03
Payer: MEDICARE

## 2024-07-03 ENCOUNTER — APPOINTMENT (OUTPATIENT)
Dept: PRIMARY CARE | Facility: CLINIC | Age: 54
End: 2024-07-03
Payer: MEDICARE

## 2024-07-03 VITALS
TEMPERATURE: 98.1 F | DIASTOLIC BLOOD PRESSURE: 92 MMHG | BODY MASS INDEX: 33.01 KG/M2 | SYSTOLIC BLOOD PRESSURE: 137 MMHG | OXYGEN SATURATION: 98 % | WEIGHT: 169 LBS | HEART RATE: 87 BPM

## 2024-07-03 DIAGNOSIS — J44.9 CHRONIC OBSTRUCTIVE PULMONARY DISEASE, UNSPECIFIED COPD TYPE (MULTI): ICD-10-CM

## 2024-07-03 DIAGNOSIS — M79.604 RIGHT LEG PAIN: ICD-10-CM

## 2024-07-03 DIAGNOSIS — R20.0 NUMBNESS OF RIGHT ANTERIOR THIGH: ICD-10-CM

## 2024-07-03 DIAGNOSIS — C52 VAGINAL CANCER (MULTI): ICD-10-CM

## 2024-07-03 DIAGNOSIS — R73.01 ELEVATED FASTING GLUCOSE: ICD-10-CM

## 2024-07-03 DIAGNOSIS — S42.352A DISPLACED COMMINUTED FRACTURE OF SHAFT OF HUMERUS, LEFT ARM, INITIAL ENCOUNTER FOR CLOSED FRACTURE: ICD-10-CM

## 2024-07-03 DIAGNOSIS — I77.1 ILIAC ARTERY STENOSIS, RIGHT (CMS-HCC): ICD-10-CM

## 2024-07-03 DIAGNOSIS — Z00.00 ENCOUNTER FOR GENERAL ADULT MEDICAL EXAMINATION WITHOUT ABNORMAL FINDINGS: Primary | ICD-10-CM

## 2024-07-03 DIAGNOSIS — E66.09 CLASS 1 OBESITY DUE TO EXCESS CALORIES WITH SERIOUS COMORBIDITY AND BODY MASS INDEX (BMI) OF 33.0 TO 33.9 IN ADULT: ICD-10-CM

## 2024-07-03 DIAGNOSIS — Z91.09 OTHER ALLERGY STATUS, OTHER THAN TO DRUGS AND BIOLOGICAL SUBSTANCES: ICD-10-CM

## 2024-07-03 DIAGNOSIS — E78.5 HYPERLIPIDEMIA, UNSPECIFIED HYPERLIPIDEMIA TYPE: ICD-10-CM

## 2024-07-03 DIAGNOSIS — R20.2 PARESTHESIA OF RIGHT LEG: ICD-10-CM

## 2024-07-03 DIAGNOSIS — E55.9 VITAMIN D DEFICIENCY: ICD-10-CM

## 2024-07-03 DIAGNOSIS — I10 HYPERTENSION, UNSPECIFIED TYPE: ICD-10-CM

## 2024-07-03 DIAGNOSIS — F17.219 CIGARETTE NICOTINE DEPENDENCE WITH NICOTINE-INDUCED DISORDER: ICD-10-CM

## 2024-07-03 DIAGNOSIS — I73.9 CLAUDICATION (CMS-HCC): ICD-10-CM

## 2024-07-03 PROBLEM — E28.319 PREMATURE MENOPAUSE: Status: ACTIVE | Noted: 2024-07-03

## 2024-07-03 PROBLEM — F43.10 POSTTRAUMATIC STRESS DISORDER: Status: ACTIVE | Noted: 2021-12-29

## 2024-07-03 PROBLEM — E66.811 CLASS 1 OBESITY DUE TO EXCESS CALORIES WITH SERIOUS COMORBIDITY AND BODY MASS INDEX (BMI) OF 33.0 TO 33.9 IN ADULT: Status: ACTIVE | Noted: 2024-07-03

## 2024-07-03 PROBLEM — F32.A DEPRESSION: Status: ACTIVE | Noted: 2024-07-03

## 2024-07-03 PROBLEM — F17.210 CIGARETTE NICOTINE DEPENDENCE: Status: ACTIVE | Noted: 2024-07-03

## 2024-07-03 PROBLEM — F41.9 ANXIETY: Status: ACTIVE | Noted: 2024-07-03

## 2024-07-03 PROBLEM — M75.31 CALCIFIC TENDINITIS OF RIGHT SHOULDER: Status: ACTIVE | Noted: 2024-07-03

## 2024-07-03 PROCEDURE — 3008F BODY MASS INDEX DOCD: CPT | Performed by: PEDIATRICS

## 2024-07-03 PROCEDURE — 3075F SYST BP GE 130 - 139MM HG: CPT | Performed by: PEDIATRICS

## 2024-07-03 PROCEDURE — 99214 OFFICE O/P EST MOD 30 MIN: CPT | Performed by: PEDIATRICS

## 2024-07-03 PROCEDURE — 97110 THERAPEUTIC EXERCISES: CPT | Mod: GP,CQ

## 2024-07-03 PROCEDURE — 3080F DIAST BP >= 90 MM HG: CPT | Performed by: PEDIATRICS

## 2024-07-03 RX ORDER — BENZONATATE 100 MG/1
100 CAPSULE ORAL 3 TIMES DAILY PRN
Qty: 90 CAPSULE | Refills: 1 | Status: SHIPPED | OUTPATIENT
Start: 2024-07-03

## 2024-07-03 RX ORDER — AMLODIPINE BESYLATE 5 MG/1
5 TABLET ORAL DAILY
Qty: 90 TABLET | Refills: 0 | Status: SHIPPED | OUTPATIENT
Start: 2024-07-03 | End: 2024-12-30

## 2024-07-03 RX ORDER — LORATADINE 10 MG/1
10 TABLET ORAL DAILY
Qty: 90 TABLET | Refills: 3 | Status: SHIPPED | OUTPATIENT
Start: 2024-07-03

## 2024-07-03 RX ORDER — CYCLOBENZAPRINE HCL 10 MG
10 TABLET ORAL 3 TIMES DAILY PRN
Qty: 21 TABLET | Refills: 0 | Status: SHIPPED | OUTPATIENT
Start: 2024-07-03 | End: 2024-07-10

## 2024-07-03 ASSESSMENT — ENCOUNTER SYMPTOMS
SHORTNESS OF BREATH: 1
HEADACHES: 0
MYALGIAS: 1
CHILLS: 0
CONSTIPATION: 0
ARTHRALGIAS: 1
DIZZINESS: 0
FEVER: 0
DIARRHEA: 0
COUGH: 1

## 2024-07-03 ASSESSMENT — PATIENT HEALTH QUESTIONNAIRE - PHQ9
SUM OF ALL RESPONSES TO PHQ9 QUESTIONS 1 AND 2: 0
2. FEELING DOWN, DEPRESSED OR HOPELESS: NOT AT ALL
1. LITTLE INTEREST OR PLEASURE IN DOING THINGS: NOT AT ALL

## 2024-07-03 NOTE — PATIENT INSTRUCTIONS
Don't have chips, pretzels, desserts and fast food in the house;  Eat fruit, veggies, chicken, fish, turkey  Quit smoking and let me know if you would like some help to do so.  Return in 3 months

## 2024-07-03 NOTE — PROGRESS NOTES
Physical Therapy Treatment    Patient Name: Jasbir Diamond  MRN: [id number]  Today's Date: 7/3/2024  Time Calculation  Start Time: 1540  Stop Time: 1605  Time Calculation (min): 25 min  PT Therapeutic Procedures Time Entry  Therapeutic Exercise Time Entry: 25    Insurance:  Visit number: 3 of 17  Authorization info: EVAL ONLY - [employer] - 100% COVERAGE thru 12/31/24 / COPAY WAIVED / OOP $1800 - MET / PT 20V/YR - 0 used / 5/31/24   16 visits 6/6-8/31  Insurance Type: Payor: [employer] / Plan: [employer] / Product Type: *No Product type* /     Current Problem   1. Displaced comminuted fracture of shaft of humerus, left arm, initial encounter for closed fracture  Follow Up In Physical Therapy          Subjective   General    Pt still only at a #5 lifting restriction. Did go back to work with restrictions.  Precautions:   #5 lifting restriction  Pain    3  Post Treatment Pain Level 3    Objective   L shoulder flexion to 155    Treatments:  Therapeutic Exercise:    UBE x 3'  Scap add with GTB x 30  SPO #1 2 x 15  Supine Gh flexion #1 2 x 15  S/L ER #1 2 x 15  S/L ABD #1 2 x 15  Table slides  Assessment   Assessment:    Pt tolerated increases in resistance for shoulder there ex.    Plan:    Progress as able with shoulder strengthening.    OP EDUCATION:   Added to HEP    Goals:   Active       PT Problem       PT Goal 1 (Progressing)       Start:  06/03/24    Expected End:  07/03/24       Pt independent with HEP         PT Goal 2 (Progressing)       Start:  06/03/24    Expected End:  07/03/24       Increase PROM left shoulder flex to at least 140deg and ER to at least 40 deg to allow pt to perform ADLs without difficulty         PT Goal 3 (Progressing)       Start:  06/03/24    Expected End:  09/01/24       Improve UEFI to >60/80         PT Goal 4 (Progressing)       Start:  06/03/24    Expected End:  09/01/24       Increase strength right shoulder to at least 4+/5 throughout to allow pt to  return to work         Patient Stated Goal 1 (Progressing)       Start:  06/03/24    Expected End:  07/03/24       Pt able to sleep at least 6 hours uninterrupted by pain

## 2024-07-03 NOTE — PROGRESS NOTES
Subjective   Patient ID: Jasbir Diamond is a 53 y.o. female who presents for Follow-up.    HPI     Medications:  Was prescribed Lipitor last week.  Did not start taking it due to side effect concerns but said she will start.    May 3rd went to [place] and was thrown from a horse as it was running. Broke left arm, going to PT, trying to do exercises at home. Landed on the right lateral thigh and was in that position for 40 minutes; after she went home, she felt numb and tingly; right thigh to knee numbness and tingling since horse accident. No numbness and tingling down the rest of leg. Went to the ED and had a full work up recommended follow up with vascular surgery outpatient. Told she had stenosis of right iliac artery.  Is continuing to follow with vascular surgery.     Follows with Dr. Rodriguez for OBGYN oncology for vaginal cancer.     Health maintenance   Mammogram   DEXA - 7/22  Cologuard: 6/22 - negative   Lipid panel:  Cholesterol  224 in 2022    Eats cheese, pasta, original Chicken sandwich by [employer]. chips  Eating more grilled chicken and fresh fruit      Review of Systems   Constitutional:  Negative for chills and fever.   Respiratory:  Positive for cough and shortness of breath.    Cardiovascular:  Negative for chest pain.   Gastrointestinal:  Negative for constipation and diarrhea.   Musculoskeletal:  Positive for arthralgias and myalgias.   Neurological:  Negative for dizziness and headaches.   Denies shortness of breath--has not needed inhaler in a long time  In AM due to postnasal drip, she coughs up phlegm in the AM that is brown in color;   Smokes one pack a day for 40 years    Objective   BP (!) 137/92   Pulse 87   Temp 36.7 °C (98.1 °F)   Wt 76.7 kg (169 lb)   SpO2 98%   BMI 33.01 kg/m²     Physical Exam  Lungs clear  Heart RRR  Absent or diminished sensation right lateral thigh up to anterior midline from a bit below right groin all the way down to middle of patella  Unable  to feel right pedal pulse  Assessment/Plan   Problem List Items Addressed This Visit             ICD-10-CM    Chronic obstructive pulmonary disease (Multi) J44.9    Vaginal cancer (Multi) C52     Following with Dr. Rodriguez          RESOLVED: Right leg pain M79.604     Nothing numbness and tingling from mid right thigh to knee since falling off a horse in May          Paresthesia of right leg R20.2     Occurring since fall from horse in May          Hypertension I10    Relevant Medications    amLODIPine (Norvasc) 5 mg tablet    Hyperlipidemia E78.5    RESOLVED: Elevated fasting glucose R73.01    Vitamin D deficiency E55.9    Claudication ([facility]) I73.9     Notices right calf pain with walking less than one block since last year         Class 1 obesity due to excess calories with serious comorbidity and body mass index (BMI) of 33.0 to 33.9 in adult E66.09, Z68.33    Relevant Orders    Vitamin D 25-Hydroxy,Total (for eval of Vitamin D levels)    Lipid Panel    CBC    Thyroid Stimulating Hormone    Numbness of right anterior thigh R20.0    Relevant Orders    EMG & nerve conduction     Other Visit Diagnoses         Codes    Encounter for general adult medical examination without abnormal findings    -  Primary Z00.00    Relevant Medications    benzonatate (Tessalon) 100 mg capsule    Other allergy status, other than to drugs and biological substances     Z91.09    Relevant Medications    loratadine (Claritin) 10 mg tablet

## 2024-07-08 ENCOUNTER — APPOINTMENT (OUTPATIENT)
Dept: PHYSICAL THERAPY | Facility: CLINIC | Age: 54
End: 2024-07-08
Payer: MEDICARE

## 2024-07-10 ENCOUNTER — APPOINTMENT (OUTPATIENT)
Dept: PHYSICAL THERAPY | Facility: CLINIC | Age: 54
End: 2024-07-10
Payer: MEDICARE

## 2024-07-11 ENCOUNTER — TELEPHONE (OUTPATIENT)
Dept: PRIMARY CARE | Facility: CLINIC | Age: 54
End: 2024-07-11

## 2024-07-11 DIAGNOSIS — E03.9 HYPOTHYROIDISM, UNSPECIFIED TYPE: ICD-10-CM

## 2024-07-11 DIAGNOSIS — S42.352A DISPLACED COMMINUTED FRACTURE OF SHAFT OF HUMERUS, LEFT ARM, INITIAL ENCOUNTER FOR CLOSED FRACTURE: ICD-10-CM

## 2024-07-11 DIAGNOSIS — E55.9 VITAMIN D DEFICIENCY: Primary | ICD-10-CM

## 2024-07-11 RX ORDER — VIT C/E/ZN/COPPR/LUTEIN/ZEAXAN 250MG-90MG
25 CAPSULE ORAL DAILY
Qty: 30 CAPSULE | Refills: 11 | Status: SHIPPED | OUTPATIENT
Start: 2024-07-11 | End: 2025-07-11

## 2024-07-11 RX ORDER — LEVOTHYROXINE SODIUM 25 UG/1
25 TABLET ORAL DAILY
Qty: 30 TABLET | Refills: 1 | Status: SHIPPED | OUTPATIENT
Start: 2024-07-11 | End: 2025-07-11

## 2024-07-12 RX ORDER — TRAZODONE HYDROCHLORIDE 50 MG/1
50 TABLET ORAL NIGHTLY
Qty: 90 TABLET | Refills: 1 | OUTPATIENT
Start: 2024-07-12 | End: 2024-08-11

## 2024-07-15 NOTE — PROGRESS NOTES
Vascular Surgery Clinic Note    CC: NPV right leg pain    History Of Present Illness:   Jasbir Diamond is a 53 y.o. female here for initial evaluation. She fell off a horse in March while in [place] and broke her left arm and landed on her right thigh. Ever since this incident, she has had right thigh numbness that is constant. She does report right calf claudication symptoms over the past 6 months that is worse with an incline. She is able to walk 1/4 mile before needing to stop. She has no nocturnal rest pain of the toes or tissue loss. She does report bilateral hip arthritis. She is a  at a restaurant. She continues to smoke 1 ppd and has no plans to quit.     She denies amaurosis fugax or TIA symptoms. She has no known family history as she was adopted.     Medical History:  Patient Active Problem List   Diagnosis    Closed fracture of shaft of left humerus    Closed displaced comminuted fracture of shaft of left humerus, initial encounter    Chronic obstructive pulmonary disease (Multi)    Vaginal cancer (Multi)    Closed fracture of shaft of left humerus, unspecified fracture morphology, sequela    Paresthesia of right leg    Hypertension    Anxiety    Calcific tendinitis of right shoulder    Depression    Hyperlipidemia    Posttraumatic stress disorder    Premature menopause    Vitamin D deficiency    Claudication (CMS-[facility])    Class 1 obesity due to excess calories with serious comorbidity and body mass index (BMI) of 33.0 to 33.9 in adult    Numbness of right anterior thigh    Cigarette nicotine dependence    Iliac artery stenosis, right (CMS-HCC)    Hypothyroid        SH:    Social Determinants of Health     Tobacco Use: High Risk (6/21/2024)    Patient History     Smoking Tobacco Use: Every Day     Smokeless Tobacco Use: Never     Passive Exposure: Not on file   Alcohol Use: Not At Risk (5/6/2024)    AUDIT-C     Frequency of Alcohol Consumption: Never     Average Number of Drinks: Patient does not  drink     Frequency of Binge Drinking: Never   Financial Resource Strain: Not on File (2021)    Received from [employer]    Financial Resource Strain     Financial Resource Strain: 0   Food Insecurity: Not on File (2021)    Received from [employer]    Food Insecurity     Food: 0   Transportation Needs: Not on File (2021)    Received from [employer]    Transportation Needs     Transportation: 0   Physical Activity: Not on File (2021)    Received from [employer]    Physical Activity     Physical Activity: 0   Stress: Not on File (2021)    Received from [employer]    Stress     Stress: 0   Social Connections: Not on File (2021)    Received from [employer]    Social Connections     Social Connections and Isolation: 0   Intimate Partner Violence: Not on file   Depression: Not at risk (7/3/2024)    PHQ-2     PHQ-2 Score: 0   Housing Stability: Not on File (2021)    Received from [employer]    Housing Stability     Housin   Utilities: Not on file   Digital Equity: Not on file   Health Literacy: Not on file        FH:  No family history on file.     Allergies:   Allergies   Allergen Reactions    Penicillins Anaphylaxis    Codeine Itching       ROS:  All systems were reviewed and noted to be negative, other than described above.     Objective:  Last Recorded Vitals  Blood pressure 142/85, pulse 79, resp. rate 18, height 1.524 m (5'), weight 74.8 kg (165 lb).    Meds:   Current Outpatient Medications   Medication Instructions    acetaminophen (TYLENOL) 650 mg, oral, Every 6 hours PRN    albuterol 90 mcg/actuation inhaler 2 puffs, inhalation, Every 4 hours PRN    amLODIPine (NORVASC) 5 mg, oral, Daily    atorvastatin (LIPITOR) 40 mg, oral, Daily    benzonatate (TESSALON) 100 mg, oral, 3 times daily PRN    cholecalciferol (VITAMIN D3) 25 mcg, oral, Daily    cyclobenzaprine (FLEXERIL) 10 mg, oral, 3 times daily PRN    gabapentin (NEURONTIN) 100 mg, oral, 3 times daily    levothyroxine (SYNTHROID, LEVOXYL) 25 mcg, oral,  Daily, Take on an empty stomach at the same time each day, either 30 to 60 minutes prior to breakfast    loratadine (CLARITIN) 10 mg, oral, Daily    traZODone (DESYREL) 50 mg, oral, Nightly       Exam:  Constitutional: Well appearing, NAD   PSYCH: Appropriate mood and affect  Eyes: Sclera clear   Neck: Supple  CV: No tachycardia   RESP: Unlabored breathing   GI: Soft, nontender, non-distended.   SKIN: No lesions  NEURO: No focal deficits noted. Motor/sensory intact.   EXTREMITIES: Warm & well perfused. No leg edema. No evidence of arterial ischemia. No evidence of venous insufficiency.   PULSES: Nonpalpable right femoral pulse. Right DP/PT with monophasic doppler signals. Palpable left DP.     Imaging Reviewed:  CT angio aorta and bilateral iliofemoral runoff w and or wo IV contrast 05/19/2024    Narrative  Interpreted By:  Yanet Suarez and Calo Sean-Matthew  STUDY:  CT ANGIO AORTA AND BILATERAL ILIOFEMORAL RUNOFF W AND OR WO IV  CONTRAST;  5/19/2024 8:16 pm    INDICATION:  Signs/Symptoms:right lower extremity numbness/tingling, r/o limb  ischemia.    COMPARISON:  CT abdomen pelvis 08/29/2017    ACCESSION NUMBER(S):  [id number]    ORDERING CLINICIAN:  BRUCE VIEYRA    TECHNIQUE:  Thin-section axial images of the abdomen, pelvis and bilateral lower  extremities were obtained in the arterial phase after intravenous  administration of 75 mL Omnipaque 350 contrast. Delayed images the  legs were obtained for assessment of venous patency. Coronal and  sagittal reformatted images were reconstructed from the axial data.  Multiplanar MIPs and 3D reconstructions were created on an  independent workstation and reviewed.    FINDINGS:  VASCULATURE:    ABDOMINAL AORTA: No abdominal aortic aneurysm or dissection.  Moderate, predominantly infrarenal aortic atherosclerosis results in  at least moderate narrowing at the bifurcation.    ABDOMINAL ARTERIES: The celiac axis, superior mesenteric artery, and  inferior mesenteric  artery are patent. A single right renal artery is  noted, which is patent. A single left renal artery is noted, which is  patent.    RIGHT LOWER EXTREMITY:    - Right Common Iliac Artery: High-grade stenosis due to calcified and  noncalcified atherosclerosis noted along virtually the whole length  of the right common iliac artery measuring up to 4.2 cm in length  with partial reconstitution just before the bifurcation. - Right  External Iliac Artery: Mild atherosclerosis. No hemodynamically  significant stenosis. - Right Internal Iliac Artery:  No  hemodynamically significant stenosis.    - Right Common Femoral Artery:  No hemodynamically significant  stenosis. - Right Profunda Artery:  No hemodynamically significant  stenosis. - Right Superficial Femoral Artery:  Mild atherosclerosis  at its proximal segment without hemodynamically significant stenosis.  - Right Popliteal Artery:  No hemodynamically significant stenosis. -  Right Anterior Tibial, Posterior Tibial, Peroneal Arteries:  No  hemodynamically significant stenosis.      LEFT LOWER EXTREMITY:    - Left Common Iliac Artery:  Calcified and noncalcified  atherosclerosis results in at least mild stenosis. - Left External  Iliac Artery:  No hemodynamically significant stenosis. - Left  Internal Iliac Artery: Mild atherosclerosis without hemodynamically  significant stenosis.    - Left Common Femoral Artery:  No hemodynamically significant  stenosis. - Left Profunda Artery:  No hemodynamically significant  stenosis. - Left Superficial Femoral Artery:  No hemodynamically  significant stenosis. - Left Popliteal Artery:  No hemodynamically  significant stenosis. - Left Anterior Tibial, Posterior Tibial,  Peroneal Arteries:  No hemodynamically significant stenosis.      ABDOMEN/PELVIS:    LIVER: No significant parenchymal abnormality.    BILE DUCTS: No significant intrahepatic or extrahepatic dilatation.    GALLBLADDER: No significant abnormality.    SPLEEN: No  significant abnormality.    PANCREAS: No significant abnormality.    ADRENALS: No significant abnormality.    KIDNEYS, URETERS, BLADDER: The kidneys are symmetric in size and  enhancement. Multiple bilateral simple cysts are noted measuring up  to 3.1 cm at the right inferior renal pole. Additional parapelvic  cysts are noted on the left. A mildly hyperdense or complex left  renal cyst measures up to 2.0 cm. No hydroureteronephrosis or  nephroureterolithiasis. The urinary bladder is within normal limits  for degree of distention.    REPRODUCTIVE ORGANS: No significant abnormality.    VESSELS: See above. No additional significant abnormality.    RETROPERITONEUM/LYMPH NODES: No enlarged lymph nodes.    BOWEL/PERITONEUM: The stomach is within normal limits. No  inflammatory bowel wall thickening or dilatation. Normal appendix.    No pneumoperitoneum, significant ascites, or abscess.      ABDOMINAL WALL: Small fat containing umbilical hernia.    MUSCULOSKELETAL:  No acute osseous abnormality. Multilevel  degenerative changes of the thoracolumbar spine are noted. A trace  left knee effusion is noted. The grade 1 anterolisthesis of L5 on S1  with bilateral pars defects. Minimal retrolisthesis of L4 on L5. The    LOWER CHEST: No acute abnormality involving the lung bases. Bibasilar  atelectasis. Emphysematous changes are noted within the partially  visualized lungs.    Impression  1. High-grade stenosis involving the right common iliac artery  extending from its origin to its most distal aspect measuring up to  4.2 cm in length. The right lower extremity arteries are otherwise  patent distally with three-vessel runoff at the ankle.  2. Moderate atherosclerosis of the abdominal aorta with at least  moderate narrowing at the aortic bifurcation. No aneurysm or  dissection.  3. Bilateral renal cysts including a 2 cm slightly hyperdense left  renal cyst.  4. L5-S1 chronic pars defects and additional findings as above.    I  personally reviewed the images/study and I agree with the findings  as stated by Delaney Martinez MD. This study was interpreted at  [facility], [place], [place].    MACRO:  None.    Signed by: Yanet Suarez 5/19/2024 10:17 PM  Dictation workstation:   [id number]  Vascular US PVR with exercise 06/21/2024    [place] [id number]  Tel [phone] and Fax [phone]      Vascular Lab Report  VASC US PVR WITH EXERCISE      Patient Name:      RADHA BISHOP     Reading Physician:  [id number] Willy Roberts MD, RPVI  Study Date:        6/21/2024            Ordering Physician: [id number] GAEL GARCIA  MRN/PID:           [id number]             Technologist:       Felicia Tsang RVT  Accession#:        [id number]         Technologist 2:  Date of Birth/Age: 1970 / 53 years Encounter#:         [id number]  Gender:            F  Admission Status:  Outpatient           Location Performed: [facility]      Diagnosis/ICD: Peripheral vascular disease, unspecified-I73.9  CPT Codes:     72262 Peripheral artery PVR with exercise      CONCLUSIONS:  Right Lower PVR: There is evidence of moderate disease at the ilio-femoral level. Hemodynamics are further compromised in the right lower extremity with exercise. Decreased digital perfusion noted. Monophasic flow is noted in the right common femoral artery, right popliteal artery, right posterior tibial artery and right dorsalis pedis artery.  Left Lower PVR: No evidence of arterial occlusive disease in the left lower extremity at rest and with exercise. Normal digital perfusion noted. Multiphasic flow is noted in the left common femoral artery, left popliteal artery, left posterior tibial artery and left dorsalis pedis artery. Unable to obtain left brachial pressure due to s/p broken humerus bone.  Exercise:Exercise completed at 2 1/2 miles per hour and 12% grade for 1 minute 30  seconds. The patient experienced pain in the right calf at 1 minute. The exam was terminated due to extremity pain.    Imaging & Doppler Findings:    RIGHT Lower PVR                Pressures Ratios  Right High Thigh               90 mmHg   0.71  Right Low Thigh                81 mmHg   0.64  Right Calf                     79 mmHg   0.62  Right Posterior Tibial (Ankle) 77 mmHg   0.61  Right Dorsalis Pedis (Ankle)   87 mmHg   0.69  Right Digit (Great Toe)        45 mmHg   0.35    Right Ankle Post Exercise      36 mmHg   0.22  5 Minute Pressure              66 mmHg   0.48      LEFT Lower PVR                Pressures Ratios  Left High Thigh               183 mmHg  1.44  Left Low Thigh                159 mmHg  1.25  Left Calf                     146 mmHg  1.15  Left Posterior Tibial (Ankle) 140 mmHg  1.10  Left Dorsalis Pedis (Ankle)   140 mmHg  1.10  Left Digit (Great Toe)        103 mmHg  0.81    Left Ankle Post Exercise      154 mmHg  0.92  5 Minute Pressure             137 mmHg  1.00    Right  Brachial Pressure 127 mmHg        [id number] Willy Roberts MD, RPVI  Electronically signed by [id number] Willy Roberts MD, RPVI on 6/21/2024 at 11:10:34 AM        ** Final **    Assessment & Plan:  1. PAD (peripheral artery disease) (CMS-[facility])  atorvastatin (Lipitor) 40 mg tablet        PAD of the RLE with job-limiting claudication symptoms.   CTA aorta with runoff shows high-grade stenosis of the right AGUEDA.   GENA is 0.69 on the right and drops to 0.22 with exercise.   Start walking program  Must quit smoking, she refused prescription for chantix and smoking cessation referral   Start aspirin 81 mg daily and Atorvastatin 40 mg daily  RTC in 6-8 weeks    Ready to quit: No  Counseling given: Yes    Guera Lomas, SHANE-CNP

## 2024-07-25 ENCOUNTER — HOSPITAL ENCOUNTER (OUTPATIENT)
Dept: NEUROLOGY | Facility: CLINIC | Age: 54
Discharge: HOME | End: 2024-07-25
Payer: MEDICARE

## 2024-07-25 ENCOUNTER — APPOINTMENT (OUTPATIENT)
Dept: NEUROLOGY | Facility: CLINIC | Age: 54
End: 2024-07-25
Payer: MEDICARE

## 2024-07-25 DIAGNOSIS — R20.0 NUMBNESS OF RIGHT ANTERIOR THIGH: ICD-10-CM

## 2024-07-25 PROCEDURE — 95910 NRV CNDJ TEST 7-8 STUDIES: CPT | Performed by: SPECIALIST

## 2024-07-25 PROCEDURE — 95886 MUSC TEST DONE W/N TEST COMP: CPT | Performed by: SPECIALIST

## 2024-07-27 DIAGNOSIS — M54.16 LUMBAR RADICULOPATHY, RIGHT: Primary | ICD-10-CM

## 2024-07-31 ENCOUNTER — HOSPITAL ENCOUNTER (OUTPATIENT)
Dept: RADIOLOGY | Facility: CLINIC | Age: 54
Discharge: HOME | End: 2024-07-31
Payer: MEDICARE

## 2024-07-31 ENCOUNTER — OFFICE VISIT (OUTPATIENT)
Dept: ORTHOPEDIC SURGERY | Facility: CLINIC | Age: 54
End: 2024-07-31
Payer: MEDICARE

## 2024-07-31 VITALS — WEIGHT: 169 LBS | BODY MASS INDEX: 33.18 KG/M2 | HEIGHT: 60 IN

## 2024-07-31 DIAGNOSIS — S42.352A DISPLACED COMMINUTED FRACTURE OF SHAFT OF HUMERUS, LEFT ARM, INITIAL ENCOUNTER FOR CLOSED FRACTURE: ICD-10-CM

## 2024-07-31 DIAGNOSIS — S42.352A DISPLACED COMMINUTED FRACTURE OF SHAFT OF HUMERUS, LEFT ARM, INITIAL ENCOUNTER FOR CLOSED FRACTURE: Primary | ICD-10-CM

## 2024-07-31 DIAGNOSIS — M75.82 ROTATOR CUFF TENDINITIS, LEFT: ICD-10-CM

## 2024-07-31 PROCEDURE — 3008F BODY MASS INDEX DOCD: CPT | Performed by: ORTHOPAEDIC SURGERY

## 2024-07-31 PROCEDURE — 99213 OFFICE O/P EST LOW 20 MIN: CPT | Mod: 24 | Performed by: ORTHOPAEDIC SURGERY

## 2024-07-31 PROCEDURE — 73060 X-RAY EXAM OF HUMERUS: CPT | Mod: LEFT SIDE | Performed by: STUDENT IN AN ORGANIZED HEALTH CARE EDUCATION/TRAINING PROGRAM

## 2024-07-31 PROCEDURE — 73060 X-RAY EXAM OF HUMERUS: CPT | Mod: LT

## 2024-07-31 PROCEDURE — 99213 OFFICE O/P EST LOW 20 MIN: CPT | Performed by: ORTHOPAEDIC SURGERY

## 2024-07-31 ASSESSMENT — PAIN DESCRIPTION - DESCRIPTORS: DESCRIPTORS: ACHING

## 2024-07-31 ASSESSMENT — PAIN SCALES - GENERAL: PAINLEVEL_OUTOF10: 5 - MODERATE PAIN

## 2024-07-31 ASSESSMENT — PAIN - FUNCTIONAL ASSESSMENT: PAIN_FUNCTIONAL_ASSESSMENT: 0-10

## 2024-08-02 PROCEDURE — 20610 DRAIN/INJ JOINT/BURSA W/O US: CPT | Performed by: ORTHOPAEDIC SURGERY

## 2024-08-02 NOTE — PROGRESS NOTES
53-year-old female just about 3 months out from left humeral shaft fracture ORIF.  She is been back to work and lifting light objects but no heavy trays.  She is really of some soreness more so in her shoulder than in her humeral shaft region.  She says this has happened before with rotator cuff tendinitis and is requesting an injection which was previously good treatment for her.  Otherwise her humerus is coming along nicely.    The patient does not endorse fevers and chills. The patient does not endorse any change in her vision or hearing. They do not endorse chest pain, shortness of breath. The patient does not endorse any abdominal discomfort. They do not endorse any skin irritation or lesions. They do not endorse any new numbness and tingling or as otherwise stated in the history of present illness.    She is in no acute distress, alert and oriented x 3.    Mood and affect are appropriate.    Respirations are unlabored.    Distal limb is pink and well perfused.    Left upper extremity evaluation demonstrates well-healed surgical incision.  Mild tenderness palpation of the superior aspect of incision near the pectoralis insertion is the only thing of note  In the area of the incision.  Her shoulder examines like someone with rotator cuff arthritis with a positive empty can test without weakness and positive impingement test.  Sensation is intact to light touch in the axillary, median, ulnar, and radial nerve distributions distally. The hand is warm and well-perfused.    I personally reviewed multiple views of the left humerus were obtained in the office today demonstrate maintenance of reduction, interval healing, and a stable position of the hardware.    53-year-old female about 3 months out from left humeral shaft fracture ORIF doing well.  Rotator cuff tendinitis treated with sterile injection as per her request.  Follow-up in 3 months time with 2 views left humerus.  Weightbearing as tolerated lifting as  tolerated at this point.    Natural History reviewed. All questions answered. The patient was in agreement with the plan.      **This note was created using voice recognition software and was not corrected for typographical or grammatical errors.**    Joint Aspiration/Injection    Date/Time: 8/2/2024 1:59 PM    Performed by: Kunal Grissom MD  Authorized by: Kunal Grissom MD    Consent:     Consent obtained:  Verbal    Consent given by:  Patient    Risks, benefits, and alternatives were discussed: yes    Universal protocol:     Patient identity confirmed:  Verbally with patient  Location:     Location:  Shoulder    Shoulder:  L glenohumeral  Anesthesia:     Anesthesia method:  None  Procedure details:     Needle gauge:  22 G    Approach:  Posterior    Steroid injected: yes      Specimen collected: no    Post-procedure details:     Dressing:  Adhesive bandage    Procedure completion:  Tolerated

## 2024-08-05 ENCOUNTER — TREATMENT (OUTPATIENT)
Dept: PHYSICAL THERAPY | Facility: CLINIC | Age: 54
End: 2024-08-05
Payer: MEDICARE

## 2024-08-05 ENCOUNTER — DOCUMENTATION (OUTPATIENT)
Dept: PHYSICAL THERAPY | Facility: CLINIC | Age: 54
End: 2024-08-05
Payer: MEDICARE

## 2024-08-05 DIAGNOSIS — S42.352A DISPLACED COMMINUTED FRACTURE OF SHAFT OF HUMERUS, LEFT ARM, INITIAL ENCOUNTER FOR CLOSED FRACTURE: ICD-10-CM

## 2024-08-05 PROCEDURE — 97140 MANUAL THERAPY 1/> REGIONS: CPT | Mod: GP

## 2024-08-05 ASSESSMENT — PAIN - FUNCTIONAL ASSESSMENT: PAIN_FUNCTIONAL_ASSESSMENT: 0-10

## 2024-08-05 ASSESSMENT — PAIN SCALES - GENERAL: PAINLEVEL_OUTOF10: 0 - NO PAIN

## 2024-08-05 NOTE — PROGRESS NOTES
Physical Therapy                 Therapy Communication Note    Patient Name: Jasbir Diamond  MRN: [id number]  Today's Date: 8/5/2024     Discipline: Physical Therapy    Missed Visit Reason:      Missed Time: No Show    Comment:

## 2024-08-05 NOTE — PROGRESS NOTES
"Physical Therapy Treatment/Discharge Summary    Patient Name: Jasbir Diamond  MRN: [id number]  Today's Date: 8/5/2024  Time Calculation  Start Time: 1520  Stop Time: 1550  Time Calculation (min): 30 min  PT Therapeutic Procedures Time Entry  Manual Therapy Time Entry: 30    Insurance:  Visit number: 4 of 17  Authorization info: 16 visits 6/6-8/31  Insurance Type: Payor: [employer] / Plan: [employer] / Product Type: *No Product type* /     Current Problem   1. Displaced comminuted fracture of shaft of humerus, left arm, initial encounter for closed fracture  Follow Up In Physical Therapy          Subjective   Pt reports she is doing well.  Had a cortisone shot about a week ago so pain is much better.  Pt is back to work but still having difficulty lifting large trays (about 20-30#).  No difficulty with ADLs and can sleep through the night.  Pt does HEP daily.     Precautions  Post-Surgical Precautions: Left shoulder precautions    Pain Assessment: 0-10  0-10 (Numeric) Pain Score: 0 - No pain  Post Treatment Pain Level 0    Objective   AROM left shoulder:   Strength    Flex    148deg              4+/5     Abd    150deg              4+/5    Ext        51deg              4+/5    ER         58deg              4/5    IR                                  4/5  UEFI  74/80    Treatments:  Therapeutic Exercise:   UBE 2\"fwd, 2\" back   With BTB:     Rows 10x2     Sh ext 10x2     ER       10x2     IR        10x2   Shoulder flex 2# 10x2   Shoulder abd 2# 10x2   Bicep curls 3# 10x2  Handouts given for all above exercises and issued BTB.       Assessment   Assessment:   PT Assessment  Assessment Comment: Pt has progress well with therapy. ROM and strength are within functional limits and pt reports no difficulty with ADLs. Pt is compliant with HEP.  All goals met.    Plan:    Discharge to [facility]    OP EDUCATION:  Outpatient Education  Individual(s) Educated: Patient  Education Provided: Home Exercise Program, " POC    Goals:   Resolved       PT Problem       PT Goal 1 (Adequate for Discharge)       Start:  06/03/24    Expected End:  07/03/24    Resolved:  08/05/24    Pt independent with HEP         PT Goal 2 (Met)       Start:  06/03/24    Expected End:  07/03/24    Resolved:  08/05/24    Increase PROM left shoulder flex to at least 140deg and ER to at least 40 deg to allow pt to perform ADLs without difficulty         PT Goal 3 (Met)       Start:  06/03/24    Expected End:  09/01/24    Resolved:  08/05/24    Improve UEFI to >60/80         PT Goal 4 (Adequate for Discharge)       Start:  06/03/24    Expected End:  09/01/24    Resolved:  08/05/24    Increase strength right shoulder to at least 4+/5 throughout to allow pt to return to work         Patient Stated Goal 1 (Met)       Start:  06/03/24    Expected End:  07/03/24    Resolved:  08/05/24    Pt able to sleep at least 6 hours uninterrupted by pain          Other Measures  Other Outcome Measures: 74/80 (UEFI)

## 2024-08-19 ENCOUNTER — TRANSCRIBE ORDERS (OUTPATIENT)
Dept: ORTHOPEDIC SURGERY | Facility: HOSPITAL | Age: 54
End: 2024-08-19
Payer: MEDICARE

## 2024-08-19 DIAGNOSIS — M54.50 LOW BACK PAIN, UNSPECIFIED BACK PAIN LATERALITY, UNSPECIFIED CHRONICITY, UNSPECIFIED WHETHER SCIATICA PRESENT: ICD-10-CM

## 2024-08-20 ENCOUNTER — HOSPITAL ENCOUNTER (OUTPATIENT)
Dept: RADIOLOGY | Facility: CLINIC | Age: 54
Discharge: HOME | End: 2024-08-20
Payer: MEDICARE

## 2024-08-20 ENCOUNTER — OFFICE VISIT (OUTPATIENT)
Dept: ORTHOPEDIC SURGERY | Facility: CLINIC | Age: 54
End: 2024-08-20
Payer: MEDICARE

## 2024-08-20 VITALS — WEIGHT: 169 LBS | BODY MASS INDEX: 33.18 KG/M2 | HEIGHT: 60 IN

## 2024-08-20 DIAGNOSIS — M54.50 LOW BACK PAIN, UNSPECIFIED BACK PAIN LATERALITY, UNSPECIFIED CHRONICITY, UNSPECIFIED WHETHER SCIATICA PRESENT: ICD-10-CM

## 2024-08-20 DIAGNOSIS — M54.16 LUMBAR RADICULOPATHY: ICD-10-CM

## 2024-08-20 PROCEDURE — 99213 OFFICE O/P EST LOW 20 MIN: CPT | Performed by: ORTHOPAEDIC SURGERY

## 2024-08-20 PROCEDURE — 72110 X-RAY EXAM L-2 SPINE 4/>VWS: CPT

## 2024-08-20 PROCEDURE — 3008F BODY MASS INDEX DOCD: CPT | Performed by: ORTHOPAEDIC SURGERY

## 2024-08-20 PROCEDURE — 72110 X-RAY EXAM L-2 SPINE 4/>VWS: CPT | Performed by: STUDENT IN AN ORGANIZED HEALTH CARE EDUCATION/TRAINING PROGRAM

## 2024-08-20 ASSESSMENT — PAIN - FUNCTIONAL ASSESSMENT: PAIN_FUNCTIONAL_ASSESSMENT: 0-10

## 2024-08-20 NOTE — LETTER
August 21, 2024     Chreyl Gonzalez MD  [address]  [address]  [facility] [address]    Patient: Jasbir Diamond   YOB: 1970   Date of Visit: 8/20/2024       Dear Dr. Cheryl Gonzalez MD:    Thank you for referring Jasbir Diamond to me for evaluation. Below are my notes for this consultation.  If you have questions, please do not hesitate to call me. I look forward to following your patient along with you.       Sincerely,     Jose Marie MD      CC: No Recipients  ______________________________________________________________________________________    HPI:Jasbir Diamond is a pleasant 53-year-old woman who comes in with a 4-month history of right leg pain and numbness and tingling.  The symptoms are predominantly in the right thigh.  She had an EMG done.  She has not started any physical therapy or other nonoperative treatment.      ROS:  Reviewed on EMR and patient intake sheet.    PMH/SH:  Reviewed on EMR and patient intake sheet.    Exam:  Physical Exam    Constitutional: Well appearing; no acute distress  Eyes: pupils are equal and round  Psych: normal affect  Respiratory: non-labored breathing  Cardiovascular: regular rate and rhythm  GI: non-distended abdomen  Musculoskeletal: no pain with range of motion of the hips bilaterally  Neurologic: [4+]/5 strength in the lower extremities bilaterally]; [negative] straight leg raise    Radiology:     EMG is consistent with an L3/L4 radiculopathy.  X-rays demonstrate a L5-S1 isthmic spondylolisthesis with severe disc degeneration    Diagnosis:    Lumbar radiculopathy    Assessment and Plan:   53-year-old woman, with lumbar radiculopathy.  At this time we will begin with physical therapy.  If the symptoms persist or remain intolerable, an MRI and follow-up would be appropriate.    The patient was in agreement with the plan. At the end of the visit today, the patient felt that all questions had been answered satisfactorily.  The patient was  pleased with the visit and very appreciative for the care rendered.     Thank you very much for the kind referral.  It is a privilege, and a pleasure, to partner with you in the care of your patients.  I would be delighted to assist you with any further consultations as needed.          Jose Marie MD    Chief of Spine Surgery, [facility]  Director of Spine Service, [facility]  , Department of Orthopaedics  [employer]  [address] [place] [address]  P: [phone]  [facility]    This note was dictated with voice recognition software.  It has not been proofread for grammatical errors, typographical mistakes or other semantic inconsistencies.

## 2024-08-21 ENCOUNTER — OFFICE VISIT (OUTPATIENT)
Dept: VASCULAR SURGERY | Facility: HOSPITAL | Age: 54
End: 2024-08-21
Payer: MEDICARE

## 2024-08-21 VITALS
HEART RATE: 96 BPM | OXYGEN SATURATION: 95 % | SYSTOLIC BLOOD PRESSURE: 132 MMHG | HEIGHT: 60 IN | BODY MASS INDEX: 33.01 KG/M2 | DIASTOLIC BLOOD PRESSURE: 92 MMHG

## 2024-08-21 DIAGNOSIS — I73.9 PAD (PERIPHERAL ARTERY DISEASE) (CMS-HCC): Primary | ICD-10-CM

## 2024-08-21 PROCEDURE — 99215 OFFICE O/P EST HI 40 MIN: CPT | Performed by: SURGERY

## 2024-08-21 PROCEDURE — 3079F DIAST BP 80-89 MM HG: CPT | Performed by: SURGERY

## 2024-08-21 PROCEDURE — 3074F SYST BP LT 130 MM HG: CPT | Performed by: SURGERY

## 2024-08-21 NOTE — PROGRESS NOTES
HPI:Jasbir Diamond is a pleasant 53-year-old woman who comes in with a 4-month history of right leg pain and numbness and tingling.  The symptoms are predominantly in the right thigh.  She had an EMG done.  She has not started any physical therapy or other nonoperative treatment.      ROS:  Reviewed on EMR and patient intake sheet.    PMH/SH:  Reviewed on EMR and patient intake sheet.    Exam:  Physical Exam    Constitutional: Well appearing; no acute distress  Eyes: pupils are equal and round  Psych: normal affect  Respiratory: non-labored breathing  Cardiovascular: regular rate and rhythm  GI: non-distended abdomen  Musculoskeletal: no pain with range of motion of the hips bilaterally  Neurologic: [4+]/5 strength in the lower extremities bilaterally]; [negative] straight leg raise    Radiology:     EMG is consistent with an L3/L4 radiculopathy.  X-rays demonstrate a L5-S1 isthmic spondylolisthesis with severe disc degeneration    Diagnosis:    Lumbar radiculopathy    Assessment and Plan:   53-year-old woman, with lumbar radiculopathy.  At this time we will begin with physical therapy.  If the symptoms persist or remain intolerable, an MRI and follow-up would be appropriate.    The patient was in agreement with the plan. At the end of the visit today, the patient felt that all questions had been answered satisfactorily.  The patient was pleased with the visit and very appreciative for the care rendered.     Thank you very much for the kind referral.  It is a privilege, and a pleasure, to partner with you in the care of your patients.  I would be delighted to assist you with any further consultations as needed.          Jose Marie MD    Chief of Spine Surgery, [facility]  Director of Spine Service, [facility]  , Department of Orthopaedics  [employer]  [address] [place]  [address]  P: [phone]  [facility]    This note was dictated with voice recognition software.  It has not been proofread for grammatical errors, typographical mistakes or other semantic inconsistencies.

## 2024-08-21 NOTE — PROGRESS NOTES
Vascular Surgery Clinic Note    CC: follow-up visit, PAD    History Of Present Illness:   Jasbir Diamond is a 53 y.o. female here for right lower extremity peripheral arterial disease with job-limiting claudication symptoms. She was last seen by Guera Lomas NP in June when she reported sx of persistent right thigh numbness after falling off her horse and landing on her right thigh in March. She also reported right calf claudication, worse w/ incline, that started 6 mos prior.   Her CT angio aorta and bilateral iliofemoral runoff on 05/19/24 demonstrates high-grade stenosis of the right AGUEDA.  Her Right GENA is 0.69 and drops to 0.22 with exercise.    She is a  and walks all day for her job.  She continues to smoke 1ppd since she wasn't 14 y/o. She was recently diagnosed with hypothyroidism and has gained 30 lb.       Medical History:  Patient Active Problem List   Diagnosis    Closed fracture of shaft of left humerus    Closed displaced comminuted fracture of shaft of left humerus, initial encounter    Chronic obstructive pulmonary disease (Multi)    Vaginal cancer (Multi)    Closed fracture of shaft of left humerus, unspecified fracture morphology, sequela    Paresthesia of right leg    Hypertension    Anxiety    Calcific tendinitis of right shoulder    Depression    Hyperlipidemia    Posttraumatic stress disorder    Premature menopause    Vitamin D deficiency    Claudication (CMS-[facility])    Class 1 obesity due to excess calories with serious comorbidity and body mass index (BMI) of 33.0 to 33.9 in adult    Numbness of right anterior thigh    Cigarette nicotine dependence    Iliac artery stenosis, right (CMS-HCC)    Hypothyroid    Lumbar radiculopathy, right        SH:    Social Determinants of Health     Tobacco Use: High Risk (7/31/2024)    Patient History     Smoking Tobacco Use: Every Day     Smokeless Tobacco Use: Never     Passive Exposure: Not on file   Alcohol Use: Not At Risk (5/6/2024)    AUDIT-C      Frequency of Alcohol Consumption: Never     Average Number of Drinks: Patient does not drink     Frequency of Binge Drinking: Never   Financial Resource Strain: Not on File (2021)    Received from [facility]    Financial Resource Strain     Financial Resource Strain: 0   Food Insecurity: Not on File (2021)    Received from [facility]    Food Insecurity     Food: 0   Transportation Needs: Not on File (2021)    Received from [facility]    Transportation Needs     Transportation: 0   Physical Activity: Not on File (2021)    Received from [facility]    Physical Activity     Physical Activity: 0   Stress: Not on File (2021)    Received from [facility]    Stress     Stress: 0   Social Connections: Not on File (2021)    Received from [facility]    Social Connections     Social Connections and Isolation: 0   Intimate Partner Violence: Not on file   Depression: Not at risk (7/3/2024)    PHQ-2     PHQ-2 Score: 0   Housing Stability: Not on File (2021)    Received from [facility]    Housing Stability     Housin   Utilities: Not on file   Digital Equity: Not on file   Health Literacy: Not on file        FH:  No family history on file.     Allergies:   Allergies   Allergen Reactions    Penicillins Anaphylaxis    Codeine Itching       Meds:   Current Outpatient Medications   Medication Instructions    acetaminophen (TYLENOL) 650 mg, oral, Every 6 hours PRN    albuterol 90 mcg/actuation inhaler 2 puffs, inhalation, Every 4 hours PRN    amLODIPine (NORVASC) 5 mg, oral, Daily    atorvastatin (LIPITOR) 40 mg, oral, Daily    benzonatate (TESSALON) 100 mg, oral, 3 times daily PRN    cholecalciferol (VITAMIN D3) 25 mcg, oral, Daily    cyclobenzaprine (FLEXERIL) 10 mg, oral, 3 times daily PRN    gabapentin (NEURONTIN) 100 mg, oral, 3 times daily    levothyroxine (SYNTHROID, LEVOXYL) 25 mcg, oral, Daily, Take on an empty stomach at the same time each day, either 30 to 60 minutes prior  to breakfast    loratadine (CLARITIN) 10 mg, oral, Daily    traZODone (DESYREL) 50 mg, oral, Nightly       ROS:  All systems were reviewed and noted to be negative, other than described above.     Objective:    Last Recorded Vitals  BP (!) 132/92 (BP Location: Left arm, Patient Position: Sitting)   Pulse 96   Ht 1.524 m (5')   SpO2 95%   BMI 33.01 kg/m²  Weight 33.01 kg     Exam:  Constitutional: Well appearing, NAD   PSYCH: Appropriate mood and affect  CV: No tachycardia, RRR  RESP: Unlabored breathing  GI: Soft, nontender, non-distended.   SKIN: No lesions  NEURO: No focal deficits noted. Motor/sensory intact.   EXTREMITIES: Warm & well perfused.   PULSES: decreased femoral pulses    Imaging Reviewed:    PVR with exercise 06/21/24  Right GENA is 0.69 and drops to 0.22 with exercise    CT angio aorta and bilateral iliofemoral with runoff 05/19/24  IMPRESSION:  1. High-grade stenosis involving the right common iliac artery  extending from its origin to its most distal aspect measuring up to  4.2 cm in length. The right lower extremity arteries are otherwise  patent distally with three-vessel runoff at the ankle.  2. Moderate atherosclerosis of the abdominal aorta with at least  moderate narrowing at the aortic bifurcation. No aneurysm or  dissection.  3. Bilateral renal cysts including a 2 cm slightly hyperdense left renal cyst.  4. L5-S1 chronic pars defects and additional findings as above.    Assessment & Plan:  Right common iliac artery stenosis with life style limiting claudication   - Plan for right common iliac artery intervention.  Surgical risks and benefits discussed and patient wants to proceed with surgery.    - Continue good BP control.   - Smoking Cessation. Patient agreed to stop smoking.    - Our office will contact her for next steps for surgical intervention.        Scribe Attestation  By signing my name below, I, Lolita LYNN-CNP, Scribe attest that this documentation has been prepared  under the direction and in the presence of Rosas Baer MD.       Rosas Baer MD  Professor & Chief, Division of Vascular Surgery & Endovascular Therapy

## 2024-08-21 NOTE — PATIENT INSTRUCTIONS
Return in 1 year with repeat GENA ultrasound study before to measure your blood flow to your lower legs.   Restart atorvastatin and aspirin 81 mg

## 2024-08-27 ENCOUNTER — HOSPITAL ENCOUNTER (OUTPATIENT)
Facility: HOSPITAL | Age: 54
Setting detail: OUTPATIENT SURGERY
End: 2024-08-27
Attending: SURGERY | Admitting: SURGERY
Payer: MEDICARE

## 2024-08-27 DIAGNOSIS — I77.1 ILIAC ARTERY STENOSIS, RIGHT (CMS-HCC): Primary | ICD-10-CM

## 2024-08-29 ENCOUNTER — TELEPHONE (OUTPATIENT)
Dept: VASCULAR SURGERY | Facility: HOSPITAL | Age: 54
End: 2024-08-29
Payer: MEDICARE

## 2024-08-29 NOTE — TELEPHONE ENCOUNTER
Pre-op instructions for 9/3/24 surgery with Dr. Baer given.  Patient advised to be NPO after midnight night prior to surgery; take only amlodipine and levothyroxine the morning of surgery with small sip of water; need for transportation; and need for updated bloodwork.  Patient verbalized understanding of all instructions and states she'll get labs drawn tomorrow at [facility].  Encouraged patient to call with questions/concerns.

## 2024-08-30 ENCOUNTER — TELEPHONE (OUTPATIENT)
Dept: VASCULAR SURGERY | Facility: HOSPITAL | Age: 54
End: 2024-08-30
Payer: MEDICARE

## 2024-08-30 NOTE — TELEPHONE ENCOUNTER
Left message with patient.  Informed her that her surgery date is postponed.  She was scheduled for surgery on 9/3/2024.  Unfortunately, we did not receive approval from her insurance company in time to secure the surgery date. Dr. Baer confirmed this cancellation.  Informed her that we will secure a new surgery date for her once her insurance approval comes through and hopefully call her next week with a new date.  Instructed her to call me if she has any questions.     Lolita Gonzales, APRN-CNP  Vascular Surgery

## 2024-08-30 NOTE — PROGRESS NOTES
Pharmacy Medication History Review    Jasbir Diamond is a 53 y.o. female who is planned to be admitted for Iliac artery stenosis, right (CMS-HCC). Pharmacy called the patient prior to their scheduled procedure and reviewed the patient's [id number] medications for accuracy.    Medications ADDED:  none  Medications CHANGED:  none  Medications REMOVED:   Acetaminophen 325mg  Albuterol HFA  Atorvastatin 40mg  Cholecalciferol 25mcg  Cyclobenzaprine 10mg  Gabapentin 100mg  Loratadine 10mg  Trazodone 50mg    Please review updated prior to admission medication list and comments regarding how patient may be taking medications differently by going to Admission tab --> Admission Orders --> Admit Orders / Review prior to admission medications.     Preferred pharmacy and allergies to be confirmed with patient by nursing the day of procedure.     Sources used to complete the med history include spoke with patient, surescripts, oarrs, care everywhere    Below are additional concerns with the patient's PTA list.  Patient states they are only taking three medications by mouth at this time: Benzonatate, amlodipine, and levothyroxine  Patient states they get some kind of weight loss medication online that comes in a vial that they inject to help them loose weight but does NOT know the name of it.      Brandy Garcia    Meds Ambulatory and Retail Services  Please reach out via Secure Chat for questions

## 2024-09-02 ENCOUNTER — ANESTHESIA EVENT (OUTPATIENT)
Dept: OPERATING ROOM | Facility: HOSPITAL | Age: 54
End: 2024-09-02

## 2024-09-03 ENCOUNTER — ANESTHESIA (OUTPATIENT)
Dept: OPERATING ROOM | Facility: HOSPITAL | Age: 54
End: 2024-09-03
Payer: MEDICARE

## 2024-09-16 ENCOUNTER — PREP FOR PROCEDURE (OUTPATIENT)
Dept: VASCULAR MEDICINE | Facility: HOSPITAL | Age: 54
End: 2024-09-16
Payer: MEDICARE

## 2024-09-16 DIAGNOSIS — E03.9 HYPOTHYROIDISM, UNSPECIFIED TYPE: ICD-10-CM

## 2024-09-16 DIAGNOSIS — I73.9 CLAUDICATION OF RIGHT LOWER EXTREMITY (CMS-HCC): Primary | ICD-10-CM

## 2024-09-16 RX ORDER — LEVOTHYROXINE SODIUM 25 UG/1
25 TABLET ORAL DAILY
Qty: 30 TABLET | Refills: 0 | Status: SHIPPED | OUTPATIENT
Start: 2024-09-16 | End: 2025-09-16

## 2024-09-16 RX ORDER — SODIUM CHLORIDE, SODIUM LACTATE, POTASSIUM CHLORIDE, CALCIUM CHLORIDE 600; 310; 30; 20 MG/100ML; MG/100ML; MG/100ML; MG/100ML
20 INJECTION, SOLUTION INTRAVENOUS CONTINUOUS
OUTPATIENT
Start: 2024-09-16

## 2024-09-24 ENCOUNTER — TELEPHONE (OUTPATIENT)
Dept: VASCULAR SURGERY | Facility: HOSPITAL | Age: 54
End: 2024-09-24
Payer: MEDICARE

## 2024-09-24 NOTE — TELEPHONE ENCOUNTER
Provided pre op instructions for patient upcoming procedure with Dr. Dunphy on 10/2. Patient states she will go for lab work on 9/30 to  lab. She will be NPO at midnight the night prior to her procedure. She will take her norvasc and synthroid the morning of her procedure with small sips of water. Pt has no further questions or concerns-instructed to call office if any arise.

## 2024-09-30 ENCOUNTER — LAB (OUTPATIENT)
Dept: LAB | Facility: LAB | Age: 54
End: 2024-09-30
Payer: MEDICARE

## 2024-09-30 DIAGNOSIS — I73.9 CLAUDICATION OF RIGHT LOWER EXTREMITY (CMS-HCC): ICD-10-CM

## 2024-09-30 DIAGNOSIS — E03.9 HYPOTHYROIDISM, UNSPECIFIED TYPE: ICD-10-CM

## 2024-09-30 LAB
ANION GAP SERPL CALCULATED.3IONS-SCNC: 11 MMOL/L (ref 10–20)
APTT PPP: 31.3 SECONDS (ref 22–32.5)
BUN SERPL-MCNC: 12 MG/DL (ref 6–23)
CALCIUM SERPL-MCNC: 8.9 MG/DL (ref 8.6–10.3)
CHLORIDE SERPL-SCNC: 108 MMOL/L (ref 98–107)
CO2 SERPL-SCNC: 23 MMOL/L (ref 21–32)
CREAT SERPL-MCNC: 0.54 MG/DL (ref 0.5–1.05)
EGFRCR SERPLBLD CKD-EPI 2021: >90 ML/MIN/1.73M*2
ERYTHROCYTE [DISTWIDTH] IN BLOOD BY AUTOMATED COUNT: 12.5 % (ref 11.5–14.5)
GLUCOSE SERPL-MCNC: 88 MG/DL (ref 74–99)
HCT VFR BLD AUTO: 46 % (ref 36–46)
HGB BLD-MCNC: 14.6 G/DL (ref 12–16)
INR PPP: <0.9 (ref 0.9–1.2)
MCH RBC QN AUTO: 29.7 PG (ref 26–34)
MCHC RBC AUTO-ENTMCNC: 31.7 G/DL (ref 32–36)
MCV RBC AUTO: 94 FL (ref 80–100)
NRBC BLD-RTO: 0 /100 WBCS (ref 0–0)
PLATELET # BLD AUTO: 233 X10*3/UL (ref 150–450)
POTASSIUM SERPL-SCNC: 4.2 MMOL/L (ref 3.5–5.3)
PROTHROMBIN TIME: 9.8 SECONDS (ref 9.3–12.7)
RBC # BLD AUTO: 4.91 X10*6/UL (ref 4–5.2)
SODIUM SERPL-SCNC: 138 MMOL/L (ref 136–145)
TSH SERPL-ACNC: 3.73 MIU/L (ref 0.44–3.98)
WBC # BLD AUTO: 8.6 X10*3/UL (ref 4.4–11.3)

## 2024-09-30 PROCEDURE — 36415 COLL VENOUS BLD VENIPUNCTURE: CPT

## 2024-09-30 PROCEDURE — 85027 COMPLETE CBC AUTOMATED: CPT

## 2024-09-30 PROCEDURE — 85610 PROTHROMBIN TIME: CPT

## 2024-09-30 PROCEDURE — 85730 THROMBOPLASTIN TIME PARTIAL: CPT

## 2024-09-30 PROCEDURE — 80048 BASIC METABOLIC PNL TOTAL CA: CPT

## 2024-09-30 PROCEDURE — 84443 ASSAY THYROID STIM HORMONE: CPT

## 2024-10-02 ENCOUNTER — HOSPITAL ENCOUNTER (OUTPATIENT)
Facility: HOSPITAL | Age: 54
Setting detail: OUTPATIENT SURGERY
Discharge: HOME | End: 2024-10-02
Attending: STUDENT IN AN ORGANIZED HEALTH CARE EDUCATION/TRAINING PROGRAM | Admitting: STUDENT IN AN ORGANIZED HEALTH CARE EDUCATION/TRAINING PROGRAM
Payer: MEDICARE

## 2024-10-02 ENCOUNTER — ANESTHESIA EVENT (OUTPATIENT)
Dept: OPERATING ROOM | Facility: HOSPITAL | Age: 54
End: 2024-10-02
Payer: MEDICARE

## 2024-10-02 ENCOUNTER — ANESTHESIA (OUTPATIENT)
Dept: OPERATING ROOM | Facility: HOSPITAL | Age: 54
End: 2024-10-02
Payer: MEDICARE

## 2024-10-02 ENCOUNTER — HOSPITAL ENCOUNTER (OUTPATIENT)
Dept: RADIOLOGY | Facility: HOSPITAL | Age: 54
Discharge: HOME | End: 2024-10-02
Payer: MEDICARE

## 2024-10-02 VITALS
DIASTOLIC BLOOD PRESSURE: 57 MMHG | OXYGEN SATURATION: 94 % | HEART RATE: 68 BPM | WEIGHT: 169.09 LBS | TEMPERATURE: 97 F | HEIGHT: 60 IN | BODY MASS INDEX: 33.2 KG/M2 | RESPIRATION RATE: 16 BRPM | SYSTOLIC BLOOD PRESSURE: 101 MMHG

## 2024-10-02 DIAGNOSIS — I73.9 CLAUDICATION OF RIGHT LOWER EXTREMITY: Primary | ICD-10-CM

## 2024-10-02 DIAGNOSIS — I73.9 PAD (PERIPHERAL ARTERY DISEASE): ICD-10-CM

## 2024-10-02 DIAGNOSIS — I77.1 ILIAC ARTERY STENOSIS, RIGHT: ICD-10-CM

## 2024-10-02 DIAGNOSIS — T84.85XA: ICD-10-CM

## 2024-10-02 LAB
ABO GROUP (TYPE) IN BLOOD: NORMAL
ACT BLD: 256 SEC (ref 83–199)
ACT BLD: 263 SEC (ref 83–199)
ACT BLD: 326 SEC (ref 83–199)
ANTIBODY SCREEN: NORMAL
RH FACTOR (ANTIGEN D): NORMAL

## 2024-10-02 PROCEDURE — C1760 CLOSURE DEV, VASC: HCPCS | Performed by: STUDENT IN AN ORGANIZED HEALTH CARE EDUCATION/TRAINING PROGRAM

## 2024-10-02 PROCEDURE — 3700000002 HC GENERAL ANESTHESIA TIME - EACH INCREMENTAL 1 MINUTE: Performed by: STUDENT IN AN ORGANIZED HEALTH CARE EDUCATION/TRAINING PROGRAM

## 2024-10-02 PROCEDURE — 2500000004 HC RX 250 GENERAL PHARMACY W/ HCPCS (ALT 636 FOR OP/ED)

## 2024-10-02 PROCEDURE — 3600000008 HC OR TIME - EACH INCREMENTAL 1 MINUTE - PROCEDURE LEVEL THREE: Performed by: STUDENT IN AN ORGANIZED HEALTH CARE EDUCATION/TRAINING PROGRAM

## 2024-10-02 PROCEDURE — 2720000007 HC OR 272 NO HCPCS: Performed by: STUDENT IN AN ORGANIZED HEALTH CARE EDUCATION/TRAINING PROGRAM

## 2024-10-02 PROCEDURE — C1887 CATHETER, GUIDING: HCPCS | Performed by: STUDENT IN AN ORGANIZED HEALTH CARE EDUCATION/TRAINING PROGRAM

## 2024-10-02 PROCEDURE — 7100000009 HC PHASE TWO TIME - INITIAL BASE CHARGE: Performed by: STUDENT IN AN ORGANIZED HEALTH CARE EDUCATION/TRAINING PROGRAM

## 2024-10-02 PROCEDURE — 2550000001 HC RX 255 CONTRASTS: Performed by: STUDENT IN AN ORGANIZED HEALTH CARE EDUCATION/TRAINING PROGRAM

## 2024-10-02 PROCEDURE — 85347 COAGULATION TIME ACTIVATED: CPT

## 2024-10-02 PROCEDURE — 3700000001 HC GENERAL ANESTHESIA TIME - INITIAL BASE CHARGE: Performed by: STUDENT IN AN ORGANIZED HEALTH CARE EDUCATION/TRAINING PROGRAM

## 2024-10-02 PROCEDURE — 2780000003 HC OR 278 NO HCPCS: Performed by: STUDENT IN AN ORGANIZED HEALTH CARE EDUCATION/TRAINING PROGRAM

## 2024-10-02 PROCEDURE — C1753 CATH, INTRAVAS ULTRASOUND: HCPCS | Performed by: STUDENT IN AN ORGANIZED HEALTH CARE EDUCATION/TRAINING PROGRAM

## 2024-10-02 PROCEDURE — C1725 CATH, TRANSLUMIN NON-LASER: HCPCS | Performed by: STUDENT IN AN ORGANIZED HEALTH CARE EDUCATION/TRAINING PROGRAM

## 2024-10-02 PROCEDURE — 7100000001 HC RECOVERY ROOM TIME - INITIAL BASE CHARGE: Performed by: STUDENT IN AN ORGANIZED HEALTH CARE EDUCATION/TRAINING PROGRAM

## 2024-10-02 PROCEDURE — C1769 GUIDE WIRE: HCPCS | Performed by: STUDENT IN AN ORGANIZED HEALTH CARE EDUCATION/TRAINING PROGRAM

## 2024-10-02 PROCEDURE — 86901 BLOOD TYPING SEROLOGIC RH(D): CPT | Performed by: STUDENT IN AN ORGANIZED HEALTH CARE EDUCATION/TRAINING PROGRAM

## 2024-10-02 PROCEDURE — 2500000004 HC RX 250 GENERAL PHARMACY W/ HCPCS (ALT 636 FOR OP/ED): Performed by: STUDENT IN AN ORGANIZED HEALTH CARE EDUCATION/TRAINING PROGRAM

## 2024-10-02 PROCEDURE — 75625 CONTRAST EXAM ABDOMINL AORTA: CPT | Performed by: STUDENT IN AN ORGANIZED HEALTH CARE EDUCATION/TRAINING PROGRAM

## 2024-10-02 PROCEDURE — C1874 STENT, COATED/COV W/DEL SYS: HCPCS | Performed by: STUDENT IN AN ORGANIZED HEALTH CARE EDUCATION/TRAINING PROGRAM

## 2024-10-02 PROCEDURE — 3600000003 HC OR TIME - INITIAL BASE CHARGE - PROCEDURE LEVEL THREE: Performed by: STUDENT IN AN ORGANIZED HEALTH CARE EDUCATION/TRAINING PROGRAM

## 2024-10-02 PROCEDURE — 7100000002 HC RECOVERY ROOM TIME - EACH INCREMENTAL 1 MINUTE: Performed by: STUDENT IN AN ORGANIZED HEALTH CARE EDUCATION/TRAINING PROGRAM

## 2024-10-02 PROCEDURE — 2500000004 HC RX 250 GENERAL PHARMACY W/ HCPCS (ALT 636 FOR OP/ED): Performed by: ANESTHESIOLOGY

## 2024-10-02 PROCEDURE — C1894 INTRO/SHEATH, NON-LASER: HCPCS | Performed by: STUDENT IN AN ORGANIZED HEALTH CARE EDUCATION/TRAINING PROGRAM

## 2024-10-02 PROCEDURE — 36415 COLL VENOUS BLD VENIPUNCTURE: CPT | Performed by: STUDENT IN AN ORGANIZED HEALTH CARE EDUCATION/TRAINING PROGRAM

## 2024-10-02 PROCEDURE — 2500000005 HC RX 250 GENERAL PHARMACY W/O HCPCS: Performed by: ANESTHESIOLOGY

## 2024-10-02 PROCEDURE — 37221 PR REVSC OPN/PRQ ILIAC ART W/STNT PLMT & ANGIOPLSTY: CPT | Performed by: STUDENT IN AN ORGANIZED HEALTH CARE EDUCATION/TRAINING PROGRAM

## 2024-10-02 PROCEDURE — P9045 ALBUMIN (HUMAN), 5%, 250 ML: HCPCS | Mod: JZ | Performed by: ANESTHESIOLOGY

## 2024-10-02 PROCEDURE — 7100000010 HC PHASE TWO TIME - EACH INCREMENTAL 1 MINUTE: Performed by: STUDENT IN AN ORGANIZED HEALTH CARE EDUCATION/TRAINING PROGRAM

## 2024-10-02 DEVICE — BX2 HEP REDUCED PROFILE BX2 7MMX29MM 6FR 135CM CATHD
Type: IMPLANTABLE DEVICE | Site: ARTERIAL | Status: FUNCTIONAL
Brand: GORE VIABAHN VBX BALLOON EXPANDABLE ENDO

## 2024-10-02 RX ORDER — HYDROMORPHONE HYDROCHLORIDE 1 MG/ML
0.5 INJECTION, SOLUTION INTRAMUSCULAR; INTRAVENOUS; SUBCUTANEOUS EVERY 5 MIN PRN
Status: DISCONTINUED | OUTPATIENT
Start: 2024-10-02 | End: 2024-10-02 | Stop reason: HOSPADM

## 2024-10-02 RX ORDER — SODIUM CHLORIDE, SODIUM LACTATE, POTASSIUM CHLORIDE, CALCIUM CHLORIDE 600; 310; 30; 20 MG/100ML; MG/100ML; MG/100ML; MG/100ML
20 INJECTION, SOLUTION INTRAVENOUS CONTINUOUS
Status: DISCONTINUED | OUTPATIENT
Start: 2024-10-02 | End: 2024-10-02 | Stop reason: HOSPADM

## 2024-10-02 RX ORDER — HEPARIN SODIUM 1000 [USP'U]/ML
INJECTION, SOLUTION INTRAVENOUS; SUBCUTANEOUS AS NEEDED
Status: DISCONTINUED | OUTPATIENT
Start: 2024-10-02 | End: 2024-10-02

## 2024-10-02 RX ORDER — ONDANSETRON HYDROCHLORIDE 2 MG/ML
INJECTION, SOLUTION INTRAVENOUS AS NEEDED
Status: DISCONTINUED | OUTPATIENT
Start: 2024-10-02 | End: 2024-10-02

## 2024-10-02 RX ORDER — ALBUMIN HUMAN 50 G/1000ML
12.5 SOLUTION INTRAVENOUS ONCE
Status: COMPLETED | OUTPATIENT
Start: 2024-10-02 | End: 2024-10-02

## 2024-10-02 RX ORDER — LIDOCAINE HYDROCHLORIDE 10 MG/ML
INJECTION, SOLUTION INFILTRATION; PERINEURAL AS NEEDED
Status: DISCONTINUED | OUTPATIENT
Start: 2024-10-02 | End: 2024-10-02 | Stop reason: HOSPADM

## 2024-10-02 RX ORDER — SODIUM CHLORIDE, SODIUM LACTATE, POTASSIUM CHLORIDE, CALCIUM CHLORIDE 600; 310; 30; 20 MG/100ML; MG/100ML; MG/100ML; MG/100ML
100 INJECTION, SOLUTION INTRAVENOUS CONTINUOUS
Status: DISCONTINUED | OUTPATIENT
Start: 2024-10-02 | End: 2024-10-02 | Stop reason: HOSPADM

## 2024-10-02 RX ORDER — MIDAZOLAM HYDROCHLORIDE 1 MG/ML
INJECTION INTRAMUSCULAR; INTRAVENOUS AS NEEDED
Status: DISCONTINUED | OUTPATIENT
Start: 2024-10-02 | End: 2024-10-02

## 2024-10-02 RX ORDER — ATORVASTATIN CALCIUM 40 MG/1
80 TABLET, FILM COATED ORAL DAILY
Qty: 30 TABLET | Refills: 3 | Status: SHIPPED | OUTPATIENT
Start: 2024-10-02 | End: 2025-10-02

## 2024-10-02 RX ORDER — OXYCODONE HYDROCHLORIDE 5 MG/1
5 TABLET ORAL EVERY 4 HOURS PRN
Status: DISCONTINUED | OUTPATIENT
Start: 2024-10-02 | End: 2024-10-02 | Stop reason: HOSPADM

## 2024-10-02 RX ORDER — HYDROMORPHONE HYDROCHLORIDE 1 MG/ML
0.2 INJECTION, SOLUTION INTRAMUSCULAR; INTRAVENOUS; SUBCUTANEOUS EVERY 5 MIN PRN
Status: DISCONTINUED | OUTPATIENT
Start: 2024-10-02 | End: 2024-10-02 | Stop reason: HOSPADM

## 2024-10-02 RX ORDER — MEPERIDINE HYDROCHLORIDE 25 MG/ML
12.5 INJECTION INTRAMUSCULAR; INTRAVENOUS; SUBCUTANEOUS EVERY 10 MIN PRN
Status: DISCONTINUED | OUTPATIENT
Start: 2024-10-02 | End: 2024-10-02 | Stop reason: HOSPADM

## 2024-10-02 RX ORDER — PROPOFOL 10 MG/ML
INJECTION, EMULSION INTRAVENOUS CONTINUOUS PRN
Status: DISCONTINUED | OUTPATIENT
Start: 2024-10-02 | End: 2024-10-02

## 2024-10-02 RX ORDER — DEXMEDETOMIDINE HYDROCHLORIDE 4 UG/ML
INJECTION, SOLUTION INTRAVENOUS CONTINUOUS PRN
Status: DISCONTINUED | OUTPATIENT
Start: 2024-10-02 | End: 2024-10-02

## 2024-10-02 RX ORDER — CLINDAMYCIN PHOSPHATE 600 MG/50ML
INJECTION, SOLUTION INTRAVENOUS AS NEEDED
Status: DISCONTINUED | OUTPATIENT
Start: 2024-10-02 | End: 2024-10-02

## 2024-10-02 RX ORDER — FENTANYL CITRATE 50 UG/ML
INJECTION, SOLUTION INTRAMUSCULAR; INTRAVENOUS AS NEEDED
Status: DISCONTINUED | OUTPATIENT
Start: 2024-10-02 | End: 2024-10-02

## 2024-10-02 RX ORDER — LIDOCAINE HYDROCHLORIDE 10 MG/ML
0.1 INJECTION, SOLUTION INFILTRATION; PERINEURAL ONCE
Status: DISCONTINUED | OUTPATIENT
Start: 2024-10-02 | End: 2024-10-02 | Stop reason: HOSPADM

## 2024-10-02 RX ORDER — ASPIRIN 81 MG/1
81 TABLET ORAL DAILY
Qty: 90 TABLET | Refills: 3 | Status: SHIPPED | OUTPATIENT
Start: 2024-10-02 | End: 2025-10-02

## 2024-10-02 RX ORDER — ONDANSETRON HYDROCHLORIDE 2 MG/ML
4 INJECTION, SOLUTION INTRAVENOUS ONCE AS NEEDED
Status: DISCONTINUED | OUTPATIENT
Start: 2024-10-02 | End: 2024-10-02 | Stop reason: HOSPADM

## 2024-10-02 RX ORDER — GLYCOPYRROLATE 0.2 MG/ML
INJECTION INTRAMUSCULAR; INTRAVENOUS AS NEEDED
Status: DISCONTINUED | OUTPATIENT
Start: 2024-10-02 | End: 2024-10-02

## 2024-10-02 RX ORDER — DROPERIDOL 2.5 MG/ML
0.62 INJECTION, SOLUTION INTRAMUSCULAR; INTRAVENOUS ONCE AS NEEDED
Status: DISCONTINUED | OUTPATIENT
Start: 2024-10-02 | End: 2024-10-02 | Stop reason: HOSPADM

## 2024-10-02 RX ORDER — IODIXANOL 320 MG/ML
INJECTION, SOLUTION INTRAVASCULAR AS NEEDED
Status: DISCONTINUED | OUTPATIENT
Start: 2024-10-02 | End: 2024-10-02 | Stop reason: HOSPADM

## 2024-10-02 RX ORDER — PROTAMINE SULFATE 10 MG/ML
INJECTION, SOLUTION INTRAVENOUS AS NEEDED
Status: DISCONTINUED | OUTPATIENT
Start: 2024-10-02 | End: 2024-10-02

## 2024-10-02 RX ADMIN — Medication 6 L/MIN: at 11:03

## 2024-10-02 RX ADMIN — HYDROMORPHONE HYDROCHLORIDE 0.2 MG: 1 INJECTION, SOLUTION INTRAMUSCULAR; INTRAVENOUS; SUBCUTANEOUS at 11:41

## 2024-10-02 RX ADMIN — ALBUMIN HUMAN 12.5 G: 0.05 INJECTION, SOLUTION INTRAVENOUS at 12:04

## 2024-10-02 ASSESSMENT — PAIN - FUNCTIONAL ASSESSMENT
PAIN_FUNCTIONAL_ASSESSMENT: UNABLE TO SELF-REPORT
PAIN_FUNCTIONAL_ASSESSMENT: 0-10
PAIN_FUNCTIONAL_ASSESSMENT: 0-10
PAIN_FUNCTIONAL_ASSESSMENT: UNABLE TO SELF-REPORT
PAIN_FUNCTIONAL_ASSESSMENT: UNABLE TO SELF-REPORT
PAIN_FUNCTIONAL_ASSESSMENT: 0-10
PAIN_FUNCTIONAL_ASSESSMENT: UNABLE TO SELF-REPORT
PAIN_FUNCTIONAL_ASSESSMENT: 0-10
PAIN_FUNCTIONAL_ASSESSMENT: 0-10
PAIN_FUNCTIONAL_ASSESSMENT: UNABLE TO SELF-REPORT
PAIN_FUNCTIONAL_ASSESSMENT: 0-10
PAIN_FUNCTIONAL_ASSESSMENT: UNABLE TO SELF-REPORT
PAIN_FUNCTIONAL_ASSESSMENT: 0-10
PAIN_FUNCTIONAL_ASSESSMENT: 0-10

## 2024-10-02 ASSESSMENT — PAIN SCALES - GENERAL
PAINLEVEL_OUTOF10: 3
PAINLEVEL_OUTOF10: 0 - NO PAIN
PAINLEVEL_OUTOF10: 0 - NO PAIN
PAINLEVEL_OUTOF10: 4
PAINLEVEL_OUTOF10: 4
PAINLEVEL_OUTOF10: 0 - NO PAIN
PAINLEVEL_OUTOF10: 0 - NO PAIN
PAINLEVEL_OUTOF10: 2
PAINLEVEL_OUTOF10: 0 - NO PAIN

## 2024-10-02 ASSESSMENT — PAIN DESCRIPTION - LOCATION: LOCATION: BACK

## 2024-10-02 ASSESSMENT — PAIN DESCRIPTION - ORIENTATION: ORIENTATION: LOWER

## 2024-10-02 NOTE — ANESTHESIA POSTPROCEDURE EVALUATION
Patient: Jasbir Diamond    Procedure Summary       Date: 10/02/24 Room / Location: [facility] OR 27 / [facility]    Anesthesia Start: 0810 Anesthesia Stop: 1108    Procedure: Angiogram Lower Extremity (Bilateral) Diagnosis:       Claudication of right lower extremity (CMS-HCC)      (Claudication of right lower extremity (CMS-HCC) [I73.9])    Surgeons: Kaitlyn M Dunphy, MD Responsible Provider: Timmy Hobbs MD    Anesthesia Type: MAC ASA Status: 3            Anesthesia Type: MAC    Vitals Value Taken Time   BP 77/51 10/02/24 1153   Temp 36.2 °C (97.2 °F) 10/02/24 1103   Pulse 62 10/02/24 1157   Resp 12 10/02/24 1157   SpO2 99 % 10/02/24 1157   Vitals shown include unfiled device data.    Anesthesia Post Evaluation    Patient location during evaluation: PACU  Patient participation: complete - patient participated  Level of consciousness: awake  Pain management: adequate  Airway patency: patent  Cardiovascular status: acceptable  Respiratory status: acceptable  Hydration status: acceptable  Postoperative Nausea and Vomiting: none        There were no known notable events for this encounter.

## 2024-10-02 NOTE — BRIEF OP NOTE
Date: 10/2/2024  OR Location: [facility] OR    Name: Jasbir Diamond, : 1970, Age: 53 y.o., MRN: [id number], Sex: female    Diagnosis  Pre-op Diagnosis      * Claudication of right lower extremity (CMS-HCC) [I73.9] Post-op Diagnosis     * Claudication of right lower extremity (CMS-HCC) [I73.9]     Procedures  Pelvic angiogram, bilateral common artery iliac kissing stents, intravascular ultrasound    Surgeons      * Kaitlyn M Dunphy - Primary    Resident/Fellow/Other Assistant:  Surgeons and Role:     * Dario Macdonald MD - Fellow    Procedure Summary  Anesthesia: Monitor Anesthesia Care  ASA: III  Anesthesia Staff: Anesthesiologist: Timmy Hobbs MD  CRNA: SHANE Johnson-CRNA  C-AA: KISHA Valentine  SRNA: Irma Fam  CÉSAR: Ming Qureshi  Estimated Blood Loss: 10 mL  Intra-op Medications:   Administrations occurring from 0800 to 1035 on 10/02/24:   Medication Name Total Dose   lidocaine (Xylocaine) 10 mg/mL (1 %) injection 16 mL   lactated Ringer's infusion 233.33 mL              Anesthesia Record               Intraprocedure I/O Totals          Intake    Dexmedetomidine 0.00 mL    The total shown is the total volume documented since Anesthesia Start was filed.    lactated Ringer's infusion 271.67 mL    Total Intake 271.67 mL          Specimen: No specimens collected     Staff:   X-Ray Technologist:   Magalysulator: John  Circulator: Geni Campaub Person: Farheen Campaub Person: Adrianna    Findings: occlusion of right common iliac artery by atherosclerotic plaque    Palpable DP pulses at conclusion of case (only with Doppler signal in right foot prior to procedure)    Complications:  None; patient tolerated the procedure well.     Disposition: PACU - hemodynamically stable.  Condition: stable  Specimens Collected: No specimens collected  Attending Attestation:     Kaitlyn M Dunphy  Phone Number: [phone]

## 2024-10-02 NOTE — H&P
Vascular Surgery Clinic Note     CC: follow-up visit, PAD     History Of Present Illness:   Jasbir Diamond is a 53 y.o. female here for right lower extremity peripheral arterial disease with job-limiting claudication symptoms. She was last seen by Guera Lomas NP in June when she reported sx of persistent right thigh numbness after falling off her horse and landing on her right thigh in March. She also reported right calf claudication, worse w/ incline, that started 6 mos prior.   Her CT angio aorta and bilateral iliofemoral runoff on 05/19/24 demonstrates high-grade stenosis of the right AGUEDA.  Her Right GENA is 0.69 and drops to 0.22 with exercise.    She is a  and walks all day for her job.  She continues to smoke 1ppd since she wasn't 14 y/o. She was recently diagnosed with hypothyroidism and has gained 30 lb.         Medical History:  Problem List       Patient Active Problem List   Diagnosis    Closed fracture of shaft of left humerus    Closed displaced comminuted fracture of shaft of left humerus, initial encounter    Chronic obstructive pulmonary disease (Multi)    Vaginal cancer (Multi)    Closed fracture of shaft of left humerus, unspecified fracture morphology, sequela    Paresthesia of right leg    Hypertension    Anxiety    Calcific tendinitis of right shoulder    Depression    Hyperlipidemia    Posttraumatic stress disorder    Premature menopause    Vitamin D deficiency    Claudication (CMS-HCC)    Class 1 obesity due to excess calories with serious comorbidity and body mass index (BMI) of 33.0 to 33.9 in adult    Numbness of right anterior thigh    Cigarette nicotine dependence    Iliac artery stenosis, right (CMS-HCC)    Hypothyroid    Lumbar radiculopathy, right            SH:     Social Determinants of Health           Tobacco Use: High Risk (7/31/2024)     Patient History      Smoking Tobacco Use: Every Day      Smokeless Tobacco Use: Never      Passive Exposure: Not on file   Alcohol Use:  Not At Risk (2024)     AUDIT-C      Frequency of Alcohol Consumption: Never      Average Number of Drinks: Patient does not drink      Frequency of Binge Drinking: Never   Financial Resource Strain: Not on File (2021)     Received from [facility]     Financial Resource Strain      Financial Resource Strain: 0   Food Insecurity: Not on File (2021)     Received from [facility]     Food Insecurity      Food: 0   Transportation Needs: Not on File (2021)     Received from [facility]     Transportation Needs      Transportation: 0   Physical Activity: Not on File (2021)     Received from [facility]     Physical Activity      Physical Activity: 0   Stress: Not on File (2021)     Received from [facility]     Stress      Stress: 0   Social Connections: Not on File (2021)     Received from [facility]     Social Connections      Social Connections and Isolation: 0   Intimate Partner Violence: Not on file   Depression: Not at risk (7/3/2024)     PHQ-2      PHQ-2 Score: 0   Housing Stability: Not on File (2021)     Received from [facility]     Housing Stability      Housin   Utilities: Not on file   Digital Equity: Not on file   Health Literacy: Not on file         FH:  Family History   No family history on file.         Allergies:   RX Allergies        Allergies   Allergen Reactions    Penicillins Anaphylaxis    Codeine Itching            Meds:        Current Outpatient Medications   Medication Instructions    acetaminophen (TYLENOL) 650 mg, oral, Every 6 hours PRN    albuterol 90 mcg/actuation inhaler 2 puffs, inhalation, Every 4 hours PRN    amLODIPine (NORVASC) 5 mg, oral, Daily    atorvastatin (LIPITOR) 40 mg, oral, Daily    benzonatate (TESSALON) 100 mg, oral, 3 times daily PRN    cholecalciferol (VITAMIN D3) 25 mcg, oral, Daily    cyclobenzaprine (FLEXERIL) 10 mg, oral, 3 times daily PRN    gabapentin (NEURONTIN) 100 mg, oral, 3 times daily    levothyroxine  (SYNTHROID, LEVOXYL) 25 mcg, oral, Daily, Take on an empty stomach at the same time each day, either 30 to 60 minutes prior to breakfast    loratadine (CLARITIN) 10 mg, oral, Daily    traZODone (DESYREL) 50 mg, oral, Nightly         ROS:  All systems were reviewed and noted to be negative, other than described above.      Objective:     Last Recorded Vitals  BP (!) 132/92 (BP Location: Left arm, Patient Position: Sitting)   Pulse 96   Ht 1.524 m (5')   SpO2 95%   BMI 33.01 kg/m²  Weight 33.01 kg      Exam:  Constitutional: Well appearing, NAD   PSYCH: Appropriate mood and affect  CV: No tachycardia, RRR  RESP: Unlabored breathing  GI: Soft, nontender, non-distended.   SKIN: No lesions  NEURO: No focal deficits noted. Motor/sensory intact.   EXTREMITIES: Warm & well perfused.   PULSES: decreased femoral pulses     Imaging Reviewed:     PVR with exercise 06/21/24  Right GENA is 0.69 and drops to 0.22 with exercise     CT angio aorta and bilateral iliofemoral with runoff 05/19/24  IMPRESSION:  1. High-grade stenosis involving the right common iliac artery  extending from its origin to its most distal aspect measuring up to  4.2 cm in length. The right lower extremity arteries are otherwise  patent distally with three-vessel runoff at the ankle.  2. Moderate atherosclerosis of the abdominal aorta with at least  moderate narrowing at the aortic bifurcation. No aneurysm or  dissection.  3. Bilateral renal cysts including a 2 cm slightly hyperdense left renal cyst.  4. L5-S1 chronic pars defects and additional findings as above.     Assessment & Plan:  Right common iliac artery stenosis with life style limiting claudication   - Plan for right common iliac artery intervention.  Surgical risks and benefits discussed and patient wants to proceed with surgery.    - Continue good BP control.   - Smoking Cessation. Patient agreed to stop smoking.    - Our office will contact her for next steps for surgical intervention.          Scribe Attestation  By signing my name below, I, Lolita MIGUEL, Scribe attest that this documentation has been prepared under the direction and in the presence of Rosas Baer MD.         oRsas Baer MD  Professor & Chief, Division of Vascular Surgery & Endovascular Therapy          No interval changes from office visit.                   Electronically signed by Rosas Baer MD at 8/24/2024  3:05 PM  Electronically signed by LAMONT Brandon at 8/30/2024 11:56 AM  Electronically signed by LAMONT Brandon at 9/26/2024  2:48 PM

## 2024-10-02 NOTE — OP NOTE
Angiogram Lower Extremity (B) Operative Note     Date: 10/2/2024  OR Location: [facility] OR    Name: Jasbir Diamond, : 1970, Age: 53 y.o., MRN: [id number], Sex: female    Diagnosis  Pre-op Diagnosis      * Claudication of right lower extremity (CMS-HCC) [I73.9] Post-op Diagnosis     * Claudication of right lower extremity (CMS-HCC) [I73.9]     Procedures  Angiogram Lower Extremity  51738 - CT REVSC OPN/PRQ ILIAC ART W/STNT PLMT & ANGIOPLSTY      Surgeons      * Kaitlyn M Dunphy - Primary    Resident/Fellow/Other Assistant:  Surgeons and Role:     * Dario Macdonald MD - Fellow    Procedure Summary  Anesthesia: Monitor Anesthesia Care  ASA: III  Anesthesia Staff: Anesthesiologist: Timmy Hobbs MD  CRNA: SHANE Johnson-CRNA  C-AA: KISHA Valentine  SRNA: Irma Fam  CÉSAR: Ming Qureshi  Estimated Blood Loss: 20 mL  Intra-op Medications:   Administrations occurring from 0800 to 1035 on 10/02/24:   Medication Name Total Dose   lidocaine (Xylocaine) 10 mg/mL (1 %) injection 16 mL   lactated Ringer's infusion 233.33 mL              Anesthesia Record               Intraprocedure I/O Totals          Intake    Dexmedetomidine 29.40 mL    The total shown is the total volume documented since Anesthesia Start was filed.    lactated Ringer's infusion 271.67 mL    clindamycin (Cleocin) IVPB 600 mg/50 mL D5 (premix) 75.00 mL    Total Intake 376.07 mL          Specimen: No specimens collected     Staff:   X-Ray Technologist:   Magalysulator: John  Circulator: Geni Campaub Person: Farheen  Scrub Person: Adrianna         Drains and/or Catheters: * None in log *    Tourniquet Times:         Implants:  Implants       Type Name Action Serial No.      Other Cardiac Implant STENT, VIABAHN VBX 2.0 BALLOON, 7 X 39 135CM REDUCE PROFILE - [id number] - [id number] Implanted [id number]     Other Cardiac Implant STENT, VIABAHN VBX 2.0 BALLOON, 7 X 29 135CM REDUCE PROFILE - [id number] - [id number] Implanted [id number]     Other Cardiac  Implant STENT, VIABAHN VBX 2.0 BALLOON, 7 X 29 135CM REDUCE PROFILE - [id number] - [id number] Implanted [id number]     Other Cardiac Implant STENT, VIABAHN VBX 2.0 BALLOON, 7 X 29 135CM REDUCE PROFILE - [id number] - [id number] Implanted [id number]              Findings: high grade R iliac stenosis    Indications: Jasbir Diamond is an 53 y.o. female who is having surgery for Claudication of right lower extremity (CMS-[facility]) [I73.9].     The patient was seen in the preoperative area. The risks, benefits, complications, treatment options, non-operative alternatives, expected recovery and outcomes were discussed with the patient. The possibilities of reaction to medication, pulmonary aspiration, injury to surrounding structures, bleeding, recurrent infection, the need for additional procedures, failure to diagnose a condition, and creating a complication requiring transfusion or operation were discussed with the patient. The patient concurred with the proposed plan, giving informed consent.  The site of surgery was properly noted/marked if necessary per policy. The patient has been actively warmed in preoperative area. Preoperative antibiotics have been ordered and given within 1 hours of incision. Venous thrombosis prophylaxis are not indicated.    Patient was seen and examined in the preoperative area.  Her daughter was present.  I explained that she has a high-grade right iliac artery stenosis that is likely contributing to her severe right lower extremity pain.  I explained my plan was to place a stent to the right iliac artery but would potentially need to place within the left iliac artery given how close her disease burden was to the aortic bifurcation.  I explained that she would have bilateral groin punctures and she is a risk of bruising at the sites.  I explained that the risks include but are not limited to bleeding, infection, limb ischemia.  Patient understood the risks and benefits and elected to  proceed.      Procedure Details:     The patient was placed supine on the operating table.  She was placed under MAC anesthesia.  Bilateral groins were prepped and she was draped in the standard fashion.  A timeout was performed amongst the members and confirmed that we were performing a right lower extremity angiogram and potentially needing to perform a left lower extremity angiogram.  All team members were in agreement.    We started by obtaining bilateral groin access.  Ultrasound guidance was used.  The access site was confirmed to be over the femoral head using fluoroscopy.  Bilateral 5 Cambodian sheath were placed.  We then sent a Glidewire and UF catheter up the left side.  An aortogram was performed and noted a flush occlusion of the right iliac artery.  We then upsized our sheath on the right side to 6 Cambodian.  We gave a weight-based bolus of heparin and checked ACT's every 30 minutes throughout the duration of the procedure.      We crossed the right iliac occlusion using an 018 wire.  We confirmed that we were in true lumen using IVUS.  We marked the site of the hypogastric takeoff.  We placed a 7 x 39 VBX distally and a 7 x 29 VBX proximally.  We took another aortogram which noted compression of the left side.  We then chose the bulbar bifurcation by about 1 to 2 mm.  We upsized our sheath on the left side to 6 Cambodian.  We placed two 7 x 29 VBX stents to build up the aortic bifurcation.  We confirmed with IVUS on both sides that we were in true lumen.  We ballooned the stents in a kissing fashion.  We took another aortogram which noted excellent flow through both vessels.  We then used two 6 Cambodian mynx devices to close the groin arteriotomy sites.  Manual pressure was held.    Patient had palpable pedal pulses postprocedure.  Her daughter was notified.  Complications:  None; patient tolerated the procedure well.    Disposition: PACU - hemodynamically stable.  Condition: stable         Additional Details:      Attending Attestation: I was present and scrubbed for the entire procedure.    Kaitlyn Dunphy, MD  [id number]

## 2024-10-02 NOTE — ANESTHESIA PREPROCEDURE EVALUATION
Patient: Jasbir Dimaond    Procedure Information       Date/Time: 10/02/24 0800    Procedure: Angiogram Lower Extremity (Bilateral)    Location: [facility] OR 27 / [facility] OR    Surgeons: Kaitlyn M Dunphy, MD            Relevant Problems   Anesthesia (within normal limits)      Cardiac   (+) Hyperlipidemia   (+) Hypertension   (+) Iliac artery stenosis, right (CMS-HCC)      Pulmonary   (+) Chronic obstructive pulmonary disease (Multi)      Neuro   (+) Anxiety   (+) Depression   (+) Lumbar radiculopathy, right   (+) Posttraumatic stress disorder      Endocrine   (+) Class 1 obesity due to excess calories with serious comorbidity and body mass index (BMI) of 33.0 to 33.9 in adult   (+) Hypothyroid       Clinical information reviewed:    Allergies                NPO Detail:  No data recorded     PHYSICAL EXAM    Anesthesia Plan    History of general anesthesia?: yes  History of complications of general anesthesia?: no    ASA 3     MAC     intravenous induction   Anesthetic plan and risks discussed with patient.    Plan discussed with CRNA.

## 2024-10-03 ENCOUNTER — APPOINTMENT (OUTPATIENT)
Dept: RADIOLOGY | Facility: HOSPITAL | Age: 54
End: 2024-10-03
Payer: MEDICARE

## 2024-10-07 ENCOUNTER — APPOINTMENT (OUTPATIENT)
Dept: PRIMARY CARE | Facility: CLINIC | Age: 54
End: 2024-10-07
Payer: MEDICARE

## 2024-10-07 VITALS
OXYGEN SATURATION: 97 % | SYSTOLIC BLOOD PRESSURE: 118 MMHG | BODY MASS INDEX: 32.98 KG/M2 | HEIGHT: 60 IN | RESPIRATION RATE: 21 BRPM | DIASTOLIC BLOOD PRESSURE: 83 MMHG | TEMPERATURE: 97.1 F | HEART RATE: 102 BPM | WEIGHT: 168 LBS

## 2024-10-07 DIAGNOSIS — E55.9 VITAMIN D DEFICIENCY: ICD-10-CM

## 2024-10-07 DIAGNOSIS — F32.A DEPRESSION, UNSPECIFIED DEPRESSION TYPE: ICD-10-CM

## 2024-10-07 DIAGNOSIS — I73.9 CLAUDICATION (CMS-HCC): ICD-10-CM

## 2024-10-07 DIAGNOSIS — E66.811 CLASS 1 OBESITY DUE TO EXCESS CALORIES WITH SERIOUS COMORBIDITY AND BODY MASS INDEX (BMI) OF 33.0 TO 33.9 IN ADULT: ICD-10-CM

## 2024-10-07 DIAGNOSIS — F41.9 ANXIETY: ICD-10-CM

## 2024-10-07 DIAGNOSIS — C52 VAGINAL CANCER (MULTI): ICD-10-CM

## 2024-10-07 DIAGNOSIS — E66.09 CLASS 1 OBESITY DUE TO EXCESS CALORIES WITH SERIOUS COMORBIDITY AND BODY MASS INDEX (BMI) OF 33.0 TO 33.9 IN ADULT: ICD-10-CM

## 2024-10-07 DIAGNOSIS — F17.219 CIGARETTE NICOTINE DEPENDENCE WITH NICOTINE-INDUCED DISORDER: ICD-10-CM

## 2024-10-07 DIAGNOSIS — M54.16 LUMBAR RADICULOPATHY, RIGHT: ICD-10-CM

## 2024-10-07 DIAGNOSIS — J44.9 CHRONIC OBSTRUCTIVE PULMONARY DISEASE, UNSPECIFIED COPD TYPE (MULTI): ICD-10-CM

## 2024-10-07 DIAGNOSIS — Z00.00 ENCOUNTER FOR GENERAL ADULT MEDICAL EXAMINATION WITHOUT ABNORMAL FINDINGS: ICD-10-CM

## 2024-10-07 DIAGNOSIS — E78.5 HYPERLIPIDEMIA, UNSPECIFIED HYPERLIPIDEMIA TYPE: ICD-10-CM

## 2024-10-07 DIAGNOSIS — Z12.31 SCREENING MAMMOGRAM FOR BREAST CANCER: ICD-10-CM

## 2024-10-07 DIAGNOSIS — I73.9 CLAUDICATION OF RIGHT LOWER EXTREMITY (CMS-HCC): ICD-10-CM

## 2024-10-07 DIAGNOSIS — I10 HYPERTENSION, UNSPECIFIED TYPE: ICD-10-CM

## 2024-10-07 DIAGNOSIS — S42.302S CLOSED FRACTURE OF SHAFT OF LEFT HUMERUS, UNSPECIFIED FRACTURE MORPHOLOGY, SEQUELA: ICD-10-CM

## 2024-10-07 DIAGNOSIS — E03.9 HYPOTHYROIDISM, UNSPECIFIED TYPE: ICD-10-CM

## 2024-10-07 DIAGNOSIS — I77.1 ILIAC ARTERY STENOSIS, RIGHT (CMS-HCC): Primary | ICD-10-CM

## 2024-10-07 PROCEDURE — 3079F DIAST BP 80-89 MM HG: CPT | Performed by: PEDIATRICS

## 2024-10-07 PROCEDURE — 4004F PT TOBACCO SCREEN RCVD TLK: CPT | Performed by: PEDIATRICS

## 2024-10-07 PROCEDURE — 3008F BODY MASS INDEX DOCD: CPT | Performed by: PEDIATRICS

## 2024-10-07 PROCEDURE — 3074F SYST BP LT 130 MM HG: CPT | Performed by: PEDIATRICS

## 2024-10-07 PROCEDURE — G2211 COMPLEX E/M VISIT ADD ON: HCPCS | Performed by: PEDIATRICS

## 2024-10-07 PROCEDURE — 99214 OFFICE O/P EST MOD 30 MIN: CPT | Performed by: PEDIATRICS

## 2024-10-07 RX ORDER — BENZONATATE 100 MG/1
100 CAPSULE ORAL 3 TIMES DAILY PRN
Qty: 90 CAPSULE | Refills: 1 | Status: SHIPPED | OUTPATIENT
Start: 2024-10-07

## 2024-10-07 RX ORDER — BUPROPION HYDROCHLORIDE 300 MG/1
300 TABLET ORAL EVERY MORNING
Qty: 30 TABLET | Refills: 1 | Status: SHIPPED | OUTPATIENT
Start: 2024-10-07 | End: 2024-12-06

## 2024-10-07 RX ORDER — BUPROPION HYDROCHLORIDE 150 MG/1
TABLET ORAL
Qty: 30 TABLET | Refills: 1 | Status: SHIPPED | OUTPATIENT
Start: 2024-10-07 | End: 2024-10-08

## 2024-10-07 RX ORDER — LEVOTHYROXINE SODIUM 25 UG/1
25 TABLET ORAL DAILY
Qty: 30 TABLET | Refills: 11 | Status: SHIPPED | OUTPATIENT
Start: 2024-10-07 | End: 2025-10-07

## 2024-10-07 RX ORDER — BUDESONIDE, GLYCOPYRROLATE, AND FORMOTEROL FUMARATE 160; 9; 4.8 UG/1; UG/1; UG/1
2 AEROSOL, METERED RESPIRATORY (INHALATION)
Qty: 10.7 G | Refills: 0 | Status: SHIPPED | OUTPATIENT
Start: 2024-10-07

## 2024-10-07 RX ORDER — ALBUTEROL SULFATE 90 UG/1
2 INHALANT RESPIRATORY (INHALATION) EVERY 4 HOURS PRN
Qty: 8.5 G | Refills: 0 | Status: SHIPPED | OUTPATIENT
Start: 2024-10-07 | End: 2025-10-07

## 2024-10-07 ASSESSMENT — PAIN SCALES - GENERAL: PAINLEVEL: 0-NO PAIN

## 2024-10-07 NOTE — PATIENT INSTRUCTIONS
"[employer] in [place]   See the gyn oncologist  Read Roland Brothers's \"Wounded Heart\"  Return in 5 weeks regarding antidepressant  "

## 2024-10-07 NOTE — PROGRESS NOTES
Subjective   Patient ID: Jasbir Diamond is a 53 y.o. female who presents for Hyperlipidemia, Hypertension, and Hypothyroidism.    HPI   Patient feels tired all the time; goes to bed at 11 and gets up at 10 but does not sleep the whole time; gets up and down going to the bathroom. Still sees L[facility].  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    Review of Systems  Constipated since surgery  Objective   /83 (BP Location: Right arm, Patient Position: Sitting, BP Cuff Size: Adult)   Pulse 102   Temp 36.2 °C (97.1 °F) (Temporal)   Resp 21   Ht 1.524 m (5')   Wt 76.2 kg (168 lb)   SpO2 97%   BMI 32.81 kg/m²     Physical Exam  2 by 1.5 cm right groin hematoma (conferred with Dr Dunphy)  Lungs clear  Heart RRR  Abd soft  Assessment/Plan   Problem List Items Addressed This Visit             ICD-10-CM    Chronic obstructive pulmonary disease (Multi) J44.9    Vaginal cancer (Multi) C52     Supposed to see [facility]         Closed fracture of shaft of left humerus, unspecified fracture morphology, sequela S42.302S     Broke arm in May but is healing well         Hypertension I10     Continue Amlodipine         Anxiety F41.9     Controlled with counseling; feels overwhelmed         Depression F32.A    Relevant Medications    buPROPion XL (Wellbutrin XL) 150 mg 24 hr tablet    buPROPion XL (Wellbutrin XL) 300 mg 24 hr tablet    Hyperlipidemia E78.5     Will get CAC test done soon         Vitamin D deficiency E55.9     Trying to remember to take her vitamin D         RESOLVED: Claudication (CMS-HCC) I73.9    RESOLVED: Class 1 obesity due to excess calories with serious comorbidity and body mass index (BMI) of 33.0 to 33.9 in adult E66.811, E66.09, Z68.33    Cigarette nicotine dependence F17.210     Smokes half pack a day; no quit date; maybe 1/1  Will do CT low dose soon         Iliac artery stenosis, right (CMS-HCC) - Primary I77.1     Stent placed on both  iliac arteries         Hypothyroid E03.9     TSH is good on Levothyroxine 25         Lumbar radiculopathy, right M54.16     Improved          Claudication of right lower extremity (CMS-HCC) I73.9     Other Visit Diagnoses         Codes    Screening mammogram for breast cancer     Z12.31    Relevant Orders    BI mammo bilateral screening tomosynthesis

## 2024-10-08 RX ORDER — BUPROPION HYDROCHLORIDE 150 MG/1
TABLET ORAL
Qty: 3 TABLET | Refills: 0 | Status: SHIPPED | OUTPATIENT
Start: 2024-10-08

## 2024-10-16 DIAGNOSIS — J44.9 CHRONIC OBSTRUCTIVE PULMONARY DISEASE, UNSPECIFIED COPD TYPE (MULTI): Primary | ICD-10-CM

## 2024-10-16 RX ORDER — FLUTICASONE FUROATE, UMECLIDINIUM BROMIDE AND VILANTEROL TRIFENATATE 100; 62.5; 25 UG/1; UG/1; UG/1
1 POWDER RESPIRATORY (INHALATION) DAILY
Qty: 1 EACH | Refills: 1 | Status: SHIPPED | OUTPATIENT
Start: 2024-10-16

## 2024-10-21 ENCOUNTER — HOSPITAL ENCOUNTER (OUTPATIENT)
Dept: RADIOLOGY | Facility: CLINIC | Age: 54
Discharge: HOME | End: 2024-10-21
Payer: MEDICARE

## 2024-10-21 DIAGNOSIS — E78.5 HYPERLIPIDEMIA, UNSPECIFIED HYPERLIPIDEMIA TYPE: ICD-10-CM

## 2024-10-21 DIAGNOSIS — F17.219 CIGARETTE NICOTINE DEPENDENCE WITH NICOTINE-INDUCED DISORDER: ICD-10-CM

## 2024-10-21 PROCEDURE — 75571 CT HRT W/O DYE W/CA TEST: CPT

## 2024-10-21 PROCEDURE — 71271 CT THORAX LUNG CANCER SCR C-: CPT | Performed by: RADIOLOGY

## 2024-10-21 PROCEDURE — 71271 CT THORAX LUNG CANCER SCR C-: CPT

## 2024-10-24 DIAGNOSIS — K76.0 FATTY LIVER: Primary | ICD-10-CM

## 2024-10-28 ENCOUNTER — OFFICE VISIT (OUTPATIENT)
Dept: URGENT CARE | Age: 54
End: 2024-10-28
Payer: MEDICARE

## 2024-10-28 VITALS
BODY MASS INDEX: 32.59 KG/M2 | TEMPERATURE: 98 F | HEART RATE: 90 BPM | OXYGEN SATURATION: 97 % | SYSTOLIC BLOOD PRESSURE: 134 MMHG | DIASTOLIC BLOOD PRESSURE: 96 MMHG | WEIGHT: 166 LBS | HEIGHT: 60 IN

## 2024-10-28 DIAGNOSIS — J02.9 SORE THROAT: ICD-10-CM

## 2024-10-28 DIAGNOSIS — J20.9 ACUTE BRONCHITIS, UNSPECIFIED ORGANISM: Primary | ICD-10-CM

## 2024-10-28 DIAGNOSIS — R05.9 COUGH, UNSPECIFIED TYPE: ICD-10-CM

## 2024-10-28 LAB
POC RAPID INFLUENZA A: NEGATIVE
POC RAPID INFLUENZA B: NEGATIVE
POC RAPID STREP: NEGATIVE
POC SARS-COV-2 AG BINAX: NORMAL

## 2024-10-28 PROCEDURE — 3080F DIAST BP >= 90 MM HG: CPT | Performed by: PHYSICIAN ASSISTANT

## 2024-10-28 PROCEDURE — 87804 INFLUENZA ASSAY W/OPTIC: CPT | Performed by: PHYSICIAN ASSISTANT

## 2024-10-28 PROCEDURE — 87811 SARS-COV-2 COVID19 W/OPTIC: CPT | Performed by: PHYSICIAN ASSISTANT

## 2024-10-28 PROCEDURE — 87880 STREP A ASSAY W/OPTIC: CPT | Performed by: PHYSICIAN ASSISTANT

## 2024-10-28 PROCEDURE — 99214 OFFICE O/P EST MOD 30 MIN: CPT | Performed by: PHYSICIAN ASSISTANT

## 2024-10-28 PROCEDURE — 3008F BODY MASS INDEX DOCD: CPT | Performed by: PHYSICIAN ASSISTANT

## 2024-10-28 PROCEDURE — 3075F SYST BP GE 130 - 139MM HG: CPT | Performed by: PHYSICIAN ASSISTANT

## 2024-10-28 RX ORDER — PREDNISONE 20 MG/1
TABLET ORAL
Qty: 10 TABLET | Refills: 0 | Status: SHIPPED | OUTPATIENT
Start: 2024-10-28

## 2024-10-28 ASSESSMENT — PAIN SCALES - GENERAL: PAINLEVEL_OUTOF10: 0-NO PAIN

## 2024-10-28 ASSESSMENT — ENCOUNTER SYMPTOMS: COUGH: 1

## 2024-10-30 ENCOUNTER — APPOINTMENT (OUTPATIENT)
Dept: ORTHOPEDIC SURGERY | Facility: CLINIC | Age: 54
End: 2024-10-30
Payer: MEDICARE

## 2024-10-30 ENCOUNTER — HOSPITAL ENCOUNTER (OUTPATIENT)
Dept: RADIOLOGY | Facility: CLINIC | Age: 54
Discharge: HOME | End: 2024-10-30
Payer: MEDICARE

## 2024-10-30 DIAGNOSIS — S42.352A DISPLACED COMMINUTED FRACTURE OF SHAFT OF HUMERUS, LEFT ARM, INITIAL ENCOUNTER FOR CLOSED FRACTURE: Primary | ICD-10-CM

## 2024-10-30 DIAGNOSIS — S42.352A DISPLACED COMMINUTED FRACTURE OF SHAFT OF HUMERUS, LEFT ARM, INITIAL ENCOUNTER FOR CLOSED FRACTURE: ICD-10-CM

## 2024-10-30 PROCEDURE — 99214 OFFICE O/P EST MOD 30 MIN: CPT | Performed by: ORTHOPAEDIC SURGERY

## 2024-10-30 PROCEDURE — 73060 X-RAY EXAM OF HUMERUS: CPT | Mod: LT

## 2024-10-31 DIAGNOSIS — F32.A DEPRESSION, UNSPECIFIED DEPRESSION TYPE: ICD-10-CM

## 2024-10-31 RX ORDER — BUPROPION HYDROCHLORIDE 150 MG/1
TABLET ORAL
Qty: 90 TABLET | Refills: 1 | OUTPATIENT
Start: 2024-10-31

## 2024-10-31 RX ORDER — BUPROPION HYDROCHLORIDE 300 MG/1
300 TABLET ORAL EVERY MORNING
Qty: 90 TABLET | Refills: 0 | Status: SHIPPED | OUTPATIENT
Start: 2024-10-31 | End: 2025-04-29

## 2024-11-04 ENCOUNTER — TELEPHONE (OUTPATIENT)
Dept: PRIMARY CARE | Facility: CLINIC | Age: 54
End: 2024-11-04
Payer: MEDICARE

## 2024-11-04 NOTE — TELEPHONE ENCOUNTER
----- Message from Cheryl Gonzalez sent at 11/4/2024  6:27 AM EST -----    ----- Message -----  From: Mat Wang  Sent: 10/30/2024   6:55 AM EST  To: Cheryl Gonzalez MD      ----- Message -----  From: Cheryl Gonzalez MD  Sent: 10/24/2024   8:07 AM EDT  To: Mat Wang    No suspicious lung nodules; repeat CT next year; fatty liver noted; I ordered Fibroscan; have her call  to schedule; This is to rule out cirrhosis which could result from fatty liver; treatment is weight loss/low fat diet

## 2024-11-04 NOTE — TELEPHONE ENCOUNTER
I notified Pt. Jasbir Diamond  of Dr. Gonzalez message. Pt. expressed understanding of the message given.

## 2024-11-12 ENCOUNTER — APPOINTMENT (OUTPATIENT)
Dept: VASCULAR MEDICINE | Facility: HOSPITAL | Age: 54
End: 2024-11-12
Payer: MEDICARE

## 2024-11-12 ENCOUNTER — APPOINTMENT (OUTPATIENT)
Dept: VASCULAR SURGERY | Facility: HOSPITAL | Age: 54
End: 2024-11-12
Payer: MEDICARE

## 2025-02-24 ENCOUNTER — OFFICE VISIT (OUTPATIENT)
Dept: URGENT CARE | Age: 55
End: 2025-02-24
Payer: MEDICARE

## 2025-02-24 VITALS
TEMPERATURE: 99 F | RESPIRATION RATE: 20 BRPM | OXYGEN SATURATION: 96 % | WEIGHT: 165 LBS | BODY MASS INDEX: 32.39 KG/M2 | SYSTOLIC BLOOD PRESSURE: 150 MMHG | DIASTOLIC BLOOD PRESSURE: 89 MMHG | HEIGHT: 60 IN | HEART RATE: 105 BPM

## 2025-02-24 DIAGNOSIS — J20.9 ACUTE BRONCHITIS, UNSPECIFIED ORGANISM: Primary | ICD-10-CM

## 2025-02-24 RX ORDER — AZITHROMYCIN 250 MG/1
TABLET, FILM COATED ORAL
Qty: 6 TABLET | Refills: 0 | Status: SHIPPED | OUTPATIENT
Start: 2025-02-24

## 2025-02-24 RX ORDER — PREDNISONE 20 MG/1
TABLET ORAL
Qty: 10 TABLET | Refills: 0 | Status: SHIPPED | OUTPATIENT
Start: 2025-02-24

## 2025-02-24 RX ORDER — BENZONATATE 200 MG/1
200 CAPSULE ORAL 3 TIMES DAILY PRN
Qty: 42 CAPSULE | Refills: 2 | Status: SHIPPED | OUTPATIENT
Start: 2025-02-24 | End: 2025-03-26

## 2025-02-24 ASSESSMENT — ENCOUNTER SYMPTOMS
DEPRESSION: 0
OCCASIONAL FEELINGS OF UNSTEADINESS: 0
LOSS OF SENSATION IN FEET: 0
COUGH: 1
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
## 2025-04-30 ENCOUNTER — OFFICE VISIT (OUTPATIENT)
Dept: ORTHOPEDIC SURGERY | Facility: CLINIC | Age: 55
End: 2025-04-30
Payer: MEDICARE

## 2025-04-30 ENCOUNTER — HOSPITAL ENCOUNTER (OUTPATIENT)
Dept: RADIOLOGY | Facility: CLINIC | Age: 55
Discharge: HOME | End: 2025-04-30
Payer: MEDICARE

## 2025-04-30 DIAGNOSIS — S42.302S CLOSED FRACTURE OF SHAFT OF LEFT HUMERUS, UNSPECIFIED FRACTURE MORPHOLOGY, SEQUELA: ICD-10-CM

## 2025-04-30 DIAGNOSIS — S42.302S CLOSED FRACTURE OF SHAFT OF LEFT HUMERUS, UNSPECIFIED FRACTURE MORPHOLOGY, SEQUELA: Primary | ICD-10-CM

## 2025-04-30 PROCEDURE — 73060 X-RAY EXAM OF HUMERUS: CPT | Mod: LT

## 2025-04-30 PROCEDURE — 99214 OFFICE O/P EST MOD 30 MIN: CPT | Performed by: ORTHOPAEDIC SURGERY

## 2025-04-30 PROCEDURE — 73060 X-RAY EXAM OF HUMERUS: CPT | Mod: LEFT SIDE | Performed by: RADIOLOGY

## 2025-04-30 NOTE — PROGRESS NOTES
53-year-old female just about 12 months months out from left humeral shaft fracture ORIF.  She is been back to work.  She is doing well overall.    The patient does not endorse fevers and chills. The patient does not endorse any change in her vision or hearing. They do not endorse chest pain, shortness of breath. The patient does not endorse any abdominal discomfort. They do not endorse any skin irritation or lesions. They do not endorse any new numbness and tingling or as otherwise stated in the history of present illness.    She is in no acute distress, alert and oriented x 3.    Mood and affect are appropriate.    Respirations are unlabored.    Distal limb is pink and well perfused.    Left upper extremity evaluation demonstrates well-healed surgical incision.  Mild tenderness palpation of the superior aspect of incision near the pectoralis insertion is the only thing of note  In the area of the incision.  Rotator cuff is strong. Sensation is intact to light touch in the axillary, median, ulnar, and radial nerve distributions distally. The hand is warm and well-perfused.    I personally reviewed multiple views of the left humerus were obtained in the office today demonstrate maintenance of reduction, interval healing, and a stable position of the hardware.    53-year-old female about 12 months out from left humeral shaft fracture ORIF doing well.  Rotator cuff tendinitis is improved.  Follow up PRN at this points.  Weightbearing as tolerated lifting as tolerated at this point.    Natural History reviewed. All questions answered. The patient was in agreement with the plan.      **This note was created using voice recognition software and was not corrected for typographical or grammatical errors.**

## 2025-05-11 PROBLEM — J43.8 OTHER EMPHYSEMA (MULTI): Status: ACTIVE | Noted: 2024-05-07

## 2025-06-21 ENCOUNTER — OFFICE VISIT (OUTPATIENT)
Dept: URGENT CARE | Age: 55
End: 2025-06-21
Payer: MEDICARE

## 2025-06-21 VITALS
DIASTOLIC BLOOD PRESSURE: 93 MMHG | WEIGHT: 174 LBS | OXYGEN SATURATION: 99 % | SYSTOLIC BLOOD PRESSURE: 141 MMHG | HEART RATE: 90 BPM | HEIGHT: 60 IN | BODY MASS INDEX: 34.16 KG/M2 | RESPIRATION RATE: 18 BRPM | TEMPERATURE: 98.5 F

## 2025-06-21 DIAGNOSIS — M62.830 LUMBAR PARASPINAL MUSCLE SPASM: Primary | ICD-10-CM

## 2025-06-21 RX ORDER — KETOROLAC TROMETHAMINE 30 MG/ML
30 INJECTION, SOLUTION INTRAMUSCULAR; INTRAVENOUS ONCE
Status: COMPLETED | OUTPATIENT
Start: 2025-06-21 | End: 2025-06-21

## 2025-06-21 RX ORDER — BACLOFEN 10 MG/1
10 TABLET ORAL 3 TIMES DAILY
Qty: 15 TABLET | Refills: 0 | Status: SHIPPED | OUTPATIENT
Start: 2025-06-21 | End: 2025-06-26

## 2025-06-21 RX ORDER — LIDOCAINE 50 MG/G
1 PATCH TOPICAL DAILY
Qty: 10 PATCH | Refills: 0 | Status: SHIPPED | OUTPATIENT
Start: 2025-06-21

## 2025-06-21 RX ADMIN — KETOROLAC TROMETHAMINE 30 MG: 30 INJECTION, SOLUTION INTRAMUSCULAR; INTRAVENOUS at 17:54

## 2025-06-21 NOTE — PATIENT INSTRUCTIONS
Alternate 600 mg ibuprofen and 650 mg acetaminophen every 3 hours    Take muscle relaxer as prescribed    Rest and ice the affected area for 20 min intervals    Lidocaine patches for 12 hour intervals    Follow up with primary care physician in 1-2 weeks
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
## 2025-06-23 DIAGNOSIS — F32.A DEPRESSION, UNSPECIFIED DEPRESSION TYPE: ICD-10-CM

## 2025-06-23 RX ORDER — BUPROPION HYDROCHLORIDE 300 MG/1
300 TABLET ORAL EVERY MORNING
Qty: 30 TABLET | Refills: 0 | Status: SHIPPED | OUTPATIENT
Start: 2025-06-23 | End: 2025-06-24 | Stop reason: SINTOL

## 2025-06-24 ENCOUNTER — OFFICE VISIT (OUTPATIENT)
Dept: PRIMARY CARE | Facility: CLINIC | Age: 55
End: 2025-06-24
Payer: MEDICARE

## 2025-06-24 VITALS
DIASTOLIC BLOOD PRESSURE: 97 MMHG | SYSTOLIC BLOOD PRESSURE: 153 MMHG | WEIGHT: 174 LBS | BODY MASS INDEX: 33.98 KG/M2 | TEMPERATURE: 98.2 F | HEART RATE: 90 BPM

## 2025-06-24 DIAGNOSIS — I73.9 PAD (PERIPHERAL ARTERY DISEASE): ICD-10-CM

## 2025-06-24 DIAGNOSIS — Z12.31 SCREENING MAMMOGRAM FOR BREAST CANCER: ICD-10-CM

## 2025-06-24 DIAGNOSIS — I73.9 CLAUDICATION OF RIGHT LOWER EXTREMITY: Primary | ICD-10-CM

## 2025-06-24 DIAGNOSIS — F32.A DEPRESSION, UNSPECIFIED DEPRESSION TYPE: ICD-10-CM

## 2025-06-24 DIAGNOSIS — C52 VAGINAL CANCER (MULTI): ICD-10-CM

## 2025-06-24 DIAGNOSIS — Z12.11 COLON CANCER SCREENING: ICD-10-CM

## 2025-06-24 DIAGNOSIS — M54.16 LUMBAR RADICULOPATHY, RIGHT: ICD-10-CM

## 2025-06-24 DIAGNOSIS — F17.219 CIGARETTE NICOTINE DEPENDENCE WITH NICOTINE-INDUCED DISORDER: ICD-10-CM

## 2025-06-24 DIAGNOSIS — J43.8 OTHER EMPHYSEMA (MULTI): ICD-10-CM

## 2025-06-24 DIAGNOSIS — E78.5 HYPERLIPIDEMIA, UNSPECIFIED HYPERLIPIDEMIA TYPE: ICD-10-CM

## 2025-06-24 DIAGNOSIS — K76.0 FATTY LIVER: ICD-10-CM

## 2025-06-24 DIAGNOSIS — E55.9 VITAMIN D DEFICIENCY: ICD-10-CM

## 2025-06-24 DIAGNOSIS — E03.9 HYPOTHYROIDISM, UNSPECIFIED TYPE: ICD-10-CM

## 2025-06-24 DIAGNOSIS — I10 HYPERTENSION, UNSPECIFIED TYPE: ICD-10-CM

## 2025-06-24 PROCEDURE — 99214 OFFICE O/P EST MOD 30 MIN: CPT | Performed by: PEDIATRICS

## 2025-06-24 PROCEDURE — 3077F SYST BP >= 140 MM HG: CPT | Performed by: PEDIATRICS

## 2025-06-24 PROCEDURE — 3080F DIAST BP >= 90 MM HG: CPT | Performed by: PEDIATRICS

## 2025-06-24 RX ORDER — AMLODIPINE BESYLATE 5 MG/1
5 TABLET ORAL DAILY
Qty: 90 TABLET | Refills: 0 | Status: SHIPPED | OUTPATIENT
Start: 2025-06-24 | End: 2025-12-21

## 2025-06-24 RX ORDER — ATORVASTATIN CALCIUM 40 MG/1
80 TABLET, FILM COATED ORAL DAILY
Qty: 90 TABLET | Refills: 0 | Status: SHIPPED | OUTPATIENT
Start: 2025-06-24 | End: 2026-06-24

## 2025-06-24 RX ORDER — LEVOTHYROXINE SODIUM 25 UG/1
25 TABLET ORAL DAILY
Qty: 90 TABLET | Refills: 0 | Status: SHIPPED | OUTPATIENT
Start: 2025-06-24 | End: 2026-06-24

## 2025-06-24 RX ORDER — ESCITALOPRAM OXALATE 10 MG/1
10 TABLET ORAL DAILY
Qty: 30 TABLET | Refills: 2 | Status: SHIPPED | OUTPATIENT
Start: 2025-06-24 | End: 2025-12-21

## 2025-06-24 NOTE — PATIENT INSTRUCTIONS
Don't buy cheese, and salt food less; Egg whites sandwiches are good;  Schedule with [facility] for follow up on vaginal cancer  Walk half hour a day  Return in 5 weeks  Fibroscan

## 2025-06-24 NOTE — PROGRESS NOTES
Subjective   Patient ID: Jasbir Diamond is a 54 y.o. female who presents for HTN    HPI   Patient admits to being noncompliant with diet and still smoking. She does have some depression and is interested in meds. She has a counselor.  Review of Systems    Objective   BP (!) 153/97   Pulse 90   Temp 36.8 °C (98.2 °F)   Wt 78.9 kg (174 lb)   BMI 33.98 kg/m²     Physical Exam  Vitals reviewed.   Constitutional:       General: She is not in acute distress.  HENT:      Head: Normocephalic.      Right Ear: Tympanic membrane normal.      Left Ear: Tympanic membrane normal.      Nose: Nose normal.      Mouth/Throat:      Pharynx: Oropharynx is clear.   Cardiovascular:      Rate and Rhythm: Normal rate and regular rhythm.      Heart sounds: Normal heart sounds.   Pulmonary:      Breath sounds: Normal breath sounds.   Abdominal:      Palpations: Abdomen is soft.      Tenderness: There is no abdominal tenderness.   Musculoskeletal:         General: No tenderness.   Skin:     Findings: No rash.   Neurological:      General: No focal deficit present.      Mental Status: She is alert.   Psychiatric:         Mood and Affect: Mood normal.         Assessment/Plan   Problem List Items Addressed This Visit           ICD-10-CM    Vaginal cancer (Multi) C52    Needs to follow up with oncology         Hypertension I10    Went off amlodipine but is willing to take it again;         Relevant Medications    amLODIPine (Norvasc) 5 mg tablet    Depression F32.A    Relevant Medications    escitalopram (Lexapro) 10 mg tablet    Hyperlipidemia E78.5    Vitamin D deficiency E55.9    Cigarette nicotine dependence F17.210    Declines meds to quit         Relevant Orders    CT lung screening low dose    Hypothyroid E03.9    Went off the thyroid med but is willing to go back on         Relevant Medications    levothyroxine (Synthroid, Levoxyl) 25 mcg tablet    Lumbar radiculopathy, right M54.16    On and off         Claudication of right lower  extremity - Primary I73.9    With stents, she walks better; will follow up in September; smokes half pack a day         Fatty liver K76.0    Relevant Orders    Fibroscan (Liver Elastography) GI     Other Visit Diagnoses         Codes      PAD (peripheral artery disease)     I73.9    Relevant Medications    atorvastatin (Lipitor) 40 mg tablet      Colon cancer screening     Z12.11    Relevant Orders    Cologuard® colon cancer screening      Screening mammogram for breast cancer     Z12.31    Relevant Orders    BI mammo bilateral screening tomosynthesis

## 2025-06-25 PROBLEM — J43.8 OTHER EMPHYSEMA (MULTI): Status: RESOLVED | Noted: 2024-05-07 | Resolved: 2025-06-25

## 2025-07-16 DIAGNOSIS — F32.A DEPRESSION, UNSPECIFIED DEPRESSION TYPE: ICD-10-CM

## 2025-07-17 RX ORDER — ESCITALOPRAM OXALATE 10 MG/1
10 TABLET ORAL DAILY
Qty: 90 TABLET | Refills: 1 | Status: SHIPPED | OUTPATIENT
Start: 2025-07-17

## 2025-07-25 LAB — NONINV COLON CA DNA+OCC BLD SCRN STL QL: NEGATIVE

## 2025-07-28 ENCOUNTER — RESULTS FOLLOW-UP (OUTPATIENT)
Dept: PRIMARY CARE | Facility: CLINIC | Age: 55
End: 2025-07-28

## 2025-07-28 ENCOUNTER — APPOINTMENT (OUTPATIENT)
Dept: PRIMARY CARE | Facility: CLINIC | Age: 55
End: 2025-07-28
Payer: MEDICARE

## 2025-07-28 VITALS
BODY MASS INDEX: 33.57 KG/M2 | OXYGEN SATURATION: 94 % | SYSTOLIC BLOOD PRESSURE: 127 MMHG | HEIGHT: 60 IN | TEMPERATURE: 98.4 F | RESPIRATION RATE: 21 BRPM | HEART RATE: 92 BPM | DIASTOLIC BLOOD PRESSURE: 87 MMHG | WEIGHT: 171 LBS

## 2025-07-28 DIAGNOSIS — R73.03 PREDIABETES: Primary | ICD-10-CM

## 2025-07-28 DIAGNOSIS — E78.5 HYPERLIPIDEMIA, UNSPECIFIED HYPERLIPIDEMIA TYPE: Primary | ICD-10-CM

## 2025-07-28 DIAGNOSIS — E55.9 VITAMIN D DEFICIENCY: ICD-10-CM

## 2025-07-28 DIAGNOSIS — R74.8 ELEVATED ALKALINE PHOSPHATASE LEVEL: ICD-10-CM

## 2025-07-28 DIAGNOSIS — I10 HYPERTENSION, UNSPECIFIED TYPE: ICD-10-CM

## 2025-07-28 DIAGNOSIS — F32.A DEPRESSION, UNSPECIFIED DEPRESSION TYPE: ICD-10-CM

## 2025-07-28 DIAGNOSIS — F17.219 CIGARETTE NICOTINE DEPENDENCE WITH NICOTINE-INDUCED DISORDER: ICD-10-CM

## 2025-07-28 DIAGNOSIS — E66.811 CLASS 1 OBESITY DUE TO EXCESS CALORIES WITH SERIOUS COMORBIDITY AND BODY MASS INDEX (BMI) OF 33.0 TO 33.9 IN ADULT: ICD-10-CM

## 2025-07-28 DIAGNOSIS — E03.9 HYPOTHYROIDISM, UNSPECIFIED TYPE: ICD-10-CM

## 2025-07-28 DIAGNOSIS — E66.09 CLASS 1 OBESITY DUE TO EXCESS CALORIES WITH SERIOUS COMORBIDITY AND BODY MASS INDEX (BMI) OF 33.0 TO 33.9 IN ADULT: ICD-10-CM

## 2025-07-28 DIAGNOSIS — K76.0 FATTY LIVER: ICD-10-CM

## 2025-07-28 DIAGNOSIS — Z00.00 ENCOUNTER FOR GENERAL ADULT MEDICAL EXAMINATION WITHOUT ABNORMAL FINDINGS: ICD-10-CM

## 2025-07-28 LAB
25(OH)D3 SERPL-MCNC: 20 NG/ML (ref 30–100)
ALBUMIN SERPL BCP-MCNC: 4.3 G/DL (ref 3.4–5)
ALP SERPL-CCNC: 126 U/L (ref 45–117)
ALT SERPL W P-5'-P-CCNC: 50 U/L (ref 16–63)
ANION GAP SERPL CALC-SCNC: 13 MMOL/L (ref 10–20)
AST SERPL W P-5'-P-CCNC: 32 U/L (ref 15–37)
BASOPHILS # BLD AUTO: 0.03 X10*3/UL (ref 0.1–1.6)
BASOPHILS NFR BLD AUTO: 0.36 % (ref 0–0.3)
BILIRUB SERPL-MCNC: 0.4 MG/DL (ref 0.2–1)
BUN SERPL-MCNC: 14 MG/DL (ref 7–18)
CALCIUM SERPL-MCNC: 9 MG/DL (ref 8.5–10.1)
CHLORIDE SERPL-SCNC: 99 MMOL/L (ref 98–107)
CHOLEST SERPL-MCNC: 221 MG/DL (ref 0–199)
CHOLESTEROL/HDL RATIO: 3.2 (ref 4.2–7)
CO2 SERPL-SCNC: 29 MMOL/L (ref 21–32)
CREAT SERPL-MCNC: 0.55 MG/DL (ref 0.6–1.1)
EGFRCR SERPLBLD CKD-EPI 2021: >90 ML/MIN/1.73M*2
EOSINOPHIL # BLD AUTO: 0.33 X10*3/UL (ref 0.04–0.5)
EOSINOPHIL NFR BLD AUTO: 3.65 % (ref 0.7–7)
ERYTHROCYTE [DISTWIDTH] IN BLOOD BY AUTOMATED COUNT: 13.5 % (ref 11.5–14.5)
GLUCOSE SERPL-MCNC: 112 MG/DL (ref 74–100)
HBA1C MFR BLD: 6 %
HCT VFR BLD AUTO: 45.2 % (ref 36.6–46.6)
HDLC SERPL-MCNC: 69 MG/DL (ref 40–59)
HGB BLD-MCNC: 14.93 G/DL (ref 12–15.4)
IS PATIENT FASTING: YES
LDLC SERPL DIRECT ASSAY-MCNC: 128 MG/DL (ref 0–100)
LYMPHOCYTES # BLD AUTO: 2.83 X10*3/UL (ref 0–6)
LYMPHOCYTES NFR BLD AUTO: 31.32 % (ref 20.5–51.1)
MCH RBC QN AUTO: 29.2 PG (ref 26–32)
MCHC RBC AUTO-ENTMCNC: 33 G/DL (ref 31–38)
MCV RBC AUTO: 88.5 FL (ref 80–96)
MONOCYTES # BLD AUTO: 0.6 X10*3/UL (ref 1.6–24.9)
MONOCYTES NFR BLD AUTO: 6.59 % (ref 1.7–9.3)
NEUTROPHILS # BLD AUTO: 5.25 X10*3/UL (ref 1.4–6.5)
NEUTROPHILS NFR BLD AUTO: 58.08 % (ref 42.2–75.2)
PLATELET # BLD AUTO: 226.9 X10*3/UL (ref 150–450)
PMV BLD AUTO: 9.39 FL (ref 7.8–11)
POTASSIUM SERPL-SCNC: 4.2 MMOL/L (ref 3.5–5.1)
PROT SERPL-MCNC: 6.9 G/DL (ref 6.4–8.2)
RBC # BLD AUTO: 5.11 X10*6/UL (ref 3.9–5.3)
SODIUM SERPL-SCNC: 137 MMOL/L (ref 136–145)
TRIGL SERPL-MCNC: 57 MG/DL
TSH SERPL-ACNC: 4.78 MIU/L (ref 0.44–3.98)
WBC # BLD AUTO: 9.04 X10*3/UL (ref 4.5–10.5)

## 2025-07-28 PROCEDURE — 83036 HEMOGLOBIN GLYCOSYLATED A1C: CPT | Performed by: PEDIATRICS

## 2025-07-28 PROCEDURE — 80053 COMPREHEN METABOLIC PANEL: CPT | Performed by: PEDIATRICS

## 2025-07-28 PROCEDURE — 84443 ASSAY THYROID STIM HORMONE: CPT | Performed by: PEDIATRICS

## 2025-07-28 PROCEDURE — 3008F BODY MASS INDEX DOCD: CPT | Performed by: PEDIATRICS

## 2025-07-28 PROCEDURE — 3079F DIAST BP 80-89 MM HG: CPT | Performed by: PEDIATRICS

## 2025-07-28 PROCEDURE — 82306 VITAMIN D 25 HYDROXY: CPT | Performed by: PEDIATRICS

## 2025-07-28 PROCEDURE — 99214 OFFICE O/P EST MOD 30 MIN: CPT | Performed by: PEDIATRICS

## 2025-07-28 PROCEDURE — 3074F SYST BP LT 130 MM HG: CPT | Performed by: PEDIATRICS

## 2025-07-28 RX ORDER — ESCITALOPRAM OXALATE 20 MG/1
20 TABLET ORAL DAILY
Qty: 30 TABLET | Refills: 2 | Status: SHIPPED | OUTPATIENT
Start: 2025-07-28 | End: 2026-01-24

## 2025-07-28 RX ORDER — LORATADINE 10 MG/1
10 TABLET ORAL DAILY
COMMUNITY

## 2025-07-28 RX ORDER — ROSUVASTATIN CALCIUM 20 MG/1
20 TABLET, COATED ORAL DAILY
Qty: 100 TABLET | Refills: 0 | Status: SHIPPED | OUTPATIENT
Start: 2025-07-28 | End: 2026-09-01

## 2025-07-28 RX ORDER — VIT C/E/ZN/COPPR/LUTEIN/ZEAXAN 250MG-90MG
25 CAPSULE ORAL DAILY
Qty: 30 CAPSULE | Refills: 11 | Status: SHIPPED | OUTPATIENT
Start: 2025-07-28 | End: 2026-07-28

## 2025-07-28 RX ORDER — BENZONATATE 100 MG/1
100 CAPSULE ORAL 3 TIMES DAILY PRN
Qty: 90 CAPSULE | Refills: 3 | Status: SHIPPED | OUTPATIENT
Start: 2025-07-28

## 2025-07-28 ASSESSMENT — PATIENT HEALTH QUESTIONNAIRE - PHQ9
2. FEELING DOWN, DEPRESSED OR HOPELESS: NOT AT ALL
1. LITTLE INTEREST OR PLEASURE IN DOING THINGS: NOT AT ALL
SUM OF ALL RESPONSES TO PHQ9 QUESTIONS 1 AND 2: 0

## 2025-07-28 ASSESSMENT — PAIN SCALES - GENERAL: PAINLEVEL_OUTOF10: 0-NO PAIN

## 2025-07-28 ASSESSMENT — ENCOUNTER SYMPTOMS: HYPERTENSION: 1

## 2025-07-28 NOTE — TELEPHONE ENCOUNTER
----- Message from Cheryl Gonzalez sent at 7/28/2025  3:23 PM EDT -----  Cholesterol is high but should go down after she starts rosuvastatin; thyroid is borderline but close enough to normal that I won't change dose; vitamin D is low-->take 1000 units daily;   Alkaline phosphatase liver enzyme is high-->get liver ultrasound done. Sugar and A1C are prediabetic-->ask Cherri to mail her a diabetic diet to prevent diabetes unless she has one at home.  ----- Message -----  From: Lab, Background User  Sent: 7/28/2025  11:03 AM EDT  To: Cheryl Gonzalez MD

## 2025-07-28 NOTE — TELEPHONE ENCOUNTER
Discussed lab results with patient. Patient demonstrated understanding of results. Patient was wondering if the heart palpitations she experiences that she originally attributed to anxiety could be due to the elevated TSH. Patient plans to take daily vitamin D supplement and would like a diabetic diet mailed to her. Patient also asked about the possibility of beginning weight loss injections to help with her sugar.

## 2025-07-28 NOTE — PROGRESS NOTES
Subjective   Patient ID: Jasbir Diamond is a 54 y.o. female who presents for Hyperlipidemia, Hypertension, medication adjustment (Pt. Isw here for ), and Anxiety.    Hyperlipidemia    Hypertension  Associated symptoms include anxiety.   Anxiety             Review of Systems    Objective   Ht (!) 1.524 m (5')   Wt 77.6 kg (171 lb)   BMI 33.40 kg/m²     Physical Exam    Assessment/Plan

## 2025-07-28 NOTE — PROGRESS NOTES
Subjective   Patient ID: Jasbir Diamond is a 54 y.o. female who presents for Hyperlipidemia, Hypertension, medication adjustment (Pt. Isw here for ), and Anxiety.    Hyperlipidemia    Hypertension  Associated symptoms include anxiety.   Anxiety           Patient feels more panic attacks on current med but depression is better. Will increase to 20 mg  Smoking is down to 12 a day; declines patch, gum, or chantix  Did not want lipitor    Review of Systems    Objective   /87 (BP Location: Right arm, Patient Position: Sitting, BP Cuff Size: Adult)   Pulse 92   Temp 36.9 °C (98.4 °F) (Temporal)   Resp 21   Ht (!) 1.524 m (5')   Wt 77.6 kg (171 lb)   SpO2 94%   BMI 33.40 kg/m²     Physical Exam  Vitals reviewed.   Constitutional:       General: She is not in acute distress.  HENT:      Head: Normocephalic.      Right Ear: Tympanic membrane normal.      Left Ear: Tympanic membrane normal.      Nose: Nose normal.      Mouth/Throat:      Pharynx: Oropharynx is clear.     Cardiovascular:      Rate and Rhythm: Normal rate and regular rhythm.      Heart sounds: Normal heart sounds.   Pulmonary:      Breath sounds: Normal breath sounds.   Abdominal:      Palpations: Abdomen is soft.      Tenderness: There is no abdominal tenderness.     Musculoskeletal:         General: No tenderness.     Skin:     Findings: No rash.     Neurological:      General: No focal deficit present.      Mental Status: She is alert.     Psychiatric:         Mood and Affect: Mood normal.         Assessment/Plan   Problem List Items Addressed This Visit           ICD-10-CM    Hypertension I10    Depression F32.A    Relevant Medications    escitalopram (Lexapro) 20 mg tablet    Hyperlipidemia - Primary E78.5    Relevant Medications    rosuvastatin (Crestor) 20 mg tablet    Other Relevant Orders    Lipid Panel (Completed)    Comprehensive Metabolic Panel (Completed)    Vitamin D deficiency E55.9    Relevant Orders    Vitamin D 25-Hydroxy,Total  (for eval of Vitamin D levels) (Completed)    Cigarette nicotine dependence F17.210    Hypothyroid E03.9    Relevant Orders    Thyroid Stimulating Hormone (Completed)    Fatty liver K76.0     Other Visit Diagnoses         Codes      Encounter for general adult medical examination without abnormal findings     Z00.00    Relevant Medications    benzonatate (Tessalon) 100 mg capsule      Class 1 obesity due to excess calories with serious comorbidity and body mass index (BMI) of 33.0 to 33.9 in adult     E66.811, E66.09, Z68.33    Relevant Orders    Hemoglobin A1C (Completed)    CBC w/5 Part Differential, [facility] (Completed)

## 2025-07-30 NOTE — TELEPHONE ENCOUNTER
----- Message from Cheryl Gonzalez sent at 7/29/2025  1:45 PM EDT -----  Please mail her 1200 calorie diabetic diet  ----- Message -----  From: Michelle Polanco  Sent: 7/28/2025   3:54 PM EDT  To: Cheryl Gonzalez MD    Discussed lab results with patient. Patient demonstrated understanding of results. Patient was wondering if the heart palpitations she experiences that she originally attributed to anxiety could be   due to the elevated TSH. Patient plans to take daily vitamin D supplement and would like a diabetic diet mailed to her. Patient also asked about the possibility of beginning weight loss injections to   help with her sugar.       ----- Message -----  From: Cheryl Gonzalez MD  Sent: 7/28/2025   3:23 PM EDT  To: Michelle Polanco    Cholesterol is high but should go down after she starts rosuvastatin; thyroid is borderline but close enough to normal that I won't change dose; vitamin D is low-->take 1000 units daily;   Alkaline phosphatase liver enzyme is high-->get liver ultrasound done. Sugar and A1C are prediabetic-->ask Cherri to mail her a diabetic diet to prevent diabetes unless she has one at home.  ----- Message -----  From: Lab, Background User  Sent: 7/28/2025  11:03 AM EDT  To: Cheryl Gonzalez MD

## 2025-08-08 ENCOUNTER — OFFICE VISIT (OUTPATIENT)
Dept: URGENT CARE | Age: 55
End: 2025-08-08
Payer: MEDICARE

## 2025-08-08 VITALS
HEART RATE: 103 BPM | SYSTOLIC BLOOD PRESSURE: 158 MMHG | WEIGHT: 171 LBS | HEIGHT: 60 IN | OXYGEN SATURATION: 99 % | DIASTOLIC BLOOD PRESSURE: 98 MMHG | BODY MASS INDEX: 33.57 KG/M2 | TEMPERATURE: 98.4 F | RESPIRATION RATE: 20 BRPM

## 2025-08-08 DIAGNOSIS — H66.001 ACUTE SUPPURATIVE OTITIS MEDIA OF RIGHT EAR WITHOUT SPONTANEOUS RUPTURE OF TYMPANIC MEMBRANE, RECURRENCE NOT SPECIFIED: Primary | ICD-10-CM

## 2025-08-08 PROCEDURE — 3080F DIAST BP >= 90 MM HG: CPT | Performed by: PHYSICIAN ASSISTANT

## 2025-08-08 PROCEDURE — 99213 OFFICE O/P EST LOW 20 MIN: CPT | Performed by: PHYSICIAN ASSISTANT

## 2025-08-08 PROCEDURE — 3008F BODY MASS INDEX DOCD: CPT | Performed by: PHYSICIAN ASSISTANT

## 2025-08-08 PROCEDURE — 3077F SYST BP >= 140 MM HG: CPT | Performed by: PHYSICIAN ASSISTANT

## 2025-08-08 RX ORDER — AZITHROMYCIN 250 MG/1
TABLET, FILM COATED ORAL
Qty: 6 TABLET | Refills: 0 | Status: SHIPPED | OUTPATIENT
Start: 2025-08-08
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
## 2025-08-11 ENCOUNTER — RESULTS FOLLOW-UP (OUTPATIENT)
Dept: PRIMARY CARE | Facility: CLINIC | Age: 55
End: 2025-08-11

## 2025-08-11 ENCOUNTER — APPOINTMENT (OUTPATIENT)
Dept: RADIOLOGY | Facility: HOSPITAL | Age: 55
End: 2025-08-11
Payer: MEDICARE

## 2025-08-11 DIAGNOSIS — R74.8 ELEVATED ALKALINE PHOSPHATASE LEVEL: ICD-10-CM

## 2025-08-11 PROCEDURE — 76705 ECHO EXAM OF ABDOMEN: CPT | Performed by: RADIOLOGY

## 2025-08-11 PROCEDURE — 76705 ECHO EXAM OF ABDOMEN: CPT

## 2025-08-12 ENCOUNTER — TELEPHONE (OUTPATIENT)
Dept: PRIMARY CARE | Facility: CLINIC | Age: 55
End: 2025-08-12
Payer: MEDICARE

## 2025-08-27 DIAGNOSIS — F32.A DEPRESSION, UNSPECIFIED DEPRESSION TYPE: ICD-10-CM

## 2025-08-28 RX ORDER — ESCITALOPRAM OXALATE 20 MG/1
20 TABLET ORAL DAILY
Qty: 90 TABLET | Refills: 1 | Status: SHIPPED | OUTPATIENT
Start: 2025-08-28

## 2025-09-03 ENCOUNTER — OFFICE VISIT (OUTPATIENT)
Dept: VASCULAR SURGERY | Facility: HOSPITAL | Age: 55
End: 2025-09-03
Payer: MEDICARE

## 2025-09-03 ENCOUNTER — HOSPITAL ENCOUNTER (OUTPATIENT)
Dept: VASCULAR MEDICINE | Facility: HOSPITAL | Age: 55
Discharge: HOME | End: 2025-09-03
Payer: MEDICARE

## 2025-09-03 VITALS
SYSTOLIC BLOOD PRESSURE: 180 MMHG | HEART RATE: 87 BPM | OXYGEN SATURATION: 99 % | BODY MASS INDEX: 33.26 KG/M2 | WEIGHT: 169.4 LBS | DIASTOLIC BLOOD PRESSURE: 92 MMHG | HEIGHT: 60 IN

## 2025-09-03 DIAGNOSIS — I10 HYPERTENSION, UNSPECIFIED TYPE: ICD-10-CM

## 2025-09-03 DIAGNOSIS — I73.9 PAD (PERIPHERAL ARTERY DISEASE): ICD-10-CM

## 2025-09-03 DIAGNOSIS — R00.2 PALPITATIONS: ICD-10-CM

## 2025-09-03 DIAGNOSIS — I73.9 PAD (PERIPHERAL ARTERY DISEASE): Primary | ICD-10-CM

## 2025-09-03 PROCEDURE — 99214 OFFICE O/P EST MOD 30 MIN: CPT | Performed by: NURSE PRACTITIONER

## 2025-09-03 PROCEDURE — 93922 UPR/L XTREMITY ART 2 LEVELS: CPT

## 2025-09-03 PROCEDURE — 99406 BEHAV CHNG SMOKING 3-10 MIN: CPT | Performed by: NURSE PRACTITIONER

## 2025-09-03 PROCEDURE — 93922 UPR/L XTREMITY ART 2 LEVELS: CPT | Performed by: INTERNAL MEDICINE

## 2025-09-03 PROCEDURE — 3008F BODY MASS INDEX DOCD: CPT | Performed by: NURSE PRACTITIONER

## 2025-09-03 PROCEDURE — 3080F DIAST BP >= 90 MM HG: CPT | Performed by: NURSE PRACTITIONER

## 2025-09-03 PROCEDURE — 3077F SYST BP >= 140 MM HG: CPT | Performed by: NURSE PRACTITIONER

## 2025-09-03 ASSESSMENT — COLUMBIA-SUICIDE SEVERITY RATING SCALE - C-SSRS
6. HAVE YOU EVER DONE ANYTHING, STARTED TO DO ANYTHING, OR PREPARED TO DO ANYTHING TO END YOUR LIFE?: NO
2. HAVE YOU ACTUALLY HAD ANY THOUGHTS OF KILLING YOURSELF?: NO
1. IN THE PAST MONTH, HAVE YOU WISHED YOU WERE DEAD OR WISHED YOU COULD GO TO SLEEP AND NOT WAKE UP?: NO

## 2025-09-03 ASSESSMENT — PATIENT HEALTH QUESTIONNAIRE - PHQ9
4. FEELING TIRED OR HAVING LITTLE ENERGY: NEARLY EVERY DAY
SUM OF ALL RESPONSES TO PHQ QUESTIONS 1-9: 17
SUM OF ALL RESPONSES TO PHQ9 QUESTIONS 1 AND 2: 6
5. POOR APPETITE OR OVEREATING: NEARLY EVERY DAY
3. TROUBLE FALLING OR STAYING ASLEEP OR SLEEPING TOO MUCH: NEARLY EVERY DAY
6. FEELING BAD ABOUT YOURSELF - OR THAT YOU ARE A FAILURE OR HAVE LET YOURSELF OR YOUR FAMILY DOWN: MORE THAN HALF THE DAYS
9. THOUGHTS THAT YOU WOULD BE BETTER OFF DEAD, OR OF HURTING YOURSELF: NOT AT ALL
8. MOVING OR SPEAKING SO SLOWLY THAT OTHER PEOPLE COULD HAVE NOTICED. OR THE OPPOSITE, BEING SO FIGETY OR RESTLESS THAT YOU HAVE BEEN MOVING AROUND A LOT MORE THAN USUAL: NOT AT ALL
2. FEELING DOWN, DEPRESSED OR HOPELESS: NEARLY EVERY DAY
7. TROUBLE CONCENTRATING ON THINGS, SUCH AS READING THE NEWSPAPER OR WATCHING TELEVISION: NOT AT ALL
1. LITTLE INTEREST OR PLEASURE IN DOING THINGS: NEARLY EVERY DAY

## 2025-09-03 ASSESSMENT — PAIN SCALES - GENERAL: PAINLEVEL_OUTOF10: 0-NO PAIN

## 2025-09-03 ASSESSMENT — ENCOUNTER SYMPTOMS
OCCASIONAL FEELINGS OF UNSTEADINESS: 0
LOSS OF SENSATION IN FEET: 0
DEPRESSION: 1

## 2025-10-27 ENCOUNTER — APPOINTMENT (OUTPATIENT)
Dept: PRIMARY CARE | Facility: CLINIC | Age: 55
End: 2025-10-27
Payer: MEDICARE

## (undated) DEVICE — TIP, SUCTION, FRAZIER, W/CONTROL VENT, 12 FR

## (undated) DEVICE — DRILL BIT 2.5MM X 135MM

## (undated) DEVICE — TORQUE DEVICE, ACCOMODATES WIRES W/DIA .010 TO .038"."

## (undated) DEVICE — SHEATH, PINNACLE, 10 CM,  6FR INTRODUCER, 6FR DIA, 2.5 CM DIALATOR

## (undated) DEVICE — DEVICE, INFLATION, ENCORE 20

## (undated) DEVICE — Device

## (undated) DEVICE — BANDAGE, COFLEX, 4 X 5 YDS, TAN, STERILE, LF

## (undated) DEVICE — PADDING, WEBRIL, UNDERCAST, STERILE, 3 IN

## (undated) DEVICE — INTRODUCER SET, MICROPUNCT, STIFF, 4FR 10CM,PLATINUM TIP,NITINOLWIRE

## (undated) DEVICE — APPLICATOR, PREP, CHLORAPREP, W/ORANGE TINT, 10.5ML

## (undated) DEVICE — SLING, ARM, LARGE

## (undated) DEVICE — DRAPE, IRRIGATION, W/POUCH, ADHESIVE STRIP, STERI DRAPE, 19 X 23 IN, DISPOSABLE, STERILE

## (undated) DEVICE — PROTECTOR, NERVE, ULNAR, PINK

## (undated) DEVICE — DRAPE, SHEET, THREE QUARTER, FAN FOLD, 57 X 77 IN

## (undated) DEVICE — CATHETER, CXI SUPPORT, .018 2.6F X 65CM, STR TIP

## (undated) DEVICE — CATHETER, VISIONS PV .018 (IVUS)

## (undated) DEVICE — COVER PROBE, SOFT FLEX W/ GEL, 5 X 48 IN (13X122CM)

## (undated) DEVICE — COVER, CART, 45 X 27 X 48 IN, CLEAR

## (undated) DEVICE — CLOSURE DEVICE, VASCULAR, MYNX CONTROL, 6FR/7FR

## (undated) DEVICE — SHEATH, INTRODUCER, 10CM, 7FR, R/O RADIOPAQUE MARKER, 7FR DIA, 2.5 CM DILATOR.

## (undated) DEVICE — DRAPE, INCISE, ANTIMICROBIAL, IOBAN 2, LARGE, 17 X 23 IN, DISPOSABLE, STERILE

## (undated) DEVICE — BANDAGE, ELASTIC, MATRIX, SELF-CLOSURE, 6 IN X 5 YD, LF

## (undated) DEVICE — GLOVE, SURGICAL, PROTEXIS PI MICRO, 6.5, PF, LF

## (undated) DEVICE — SUTURE, VICRYL, 1, 27 IN, CT-1, VIOLET

## (undated) DEVICE — CATHETER, QUICKCROSS SUPPORT .035 X 90CM

## (undated) DEVICE — GUIDEWIRE, GOLD TIP, 45 DEG ANGLE, 300 CM, 3CM TIP

## (undated) DEVICE — CATHETER, BALLOON, EVERCROSS, 7MM X 40MM X 80CM, OTW PTA

## (undated) DEVICE — CATHETER, UNIVERSAL FLUSH, 5FR, 65CM

## (undated) DEVICE — DRAPE, SHEET, U, W/ADHESIVE STRIP, IMPERVIOUS, 60 X 70 IN, DISPOSABLE, LF, STERILE

## (undated) DEVICE — COVER, BACK TABLE, 65 X 90, HVY REINFORCED

## (undated) DEVICE — BANDAGE, GAUZE, CONFORMING, KERLIX, 6 PLY, 4.5 IN X 4.1 YD

## (undated) DEVICE — DRILL BIT 2.5MM X 215MM

## (undated) DEVICE — GUIDEWIRE, STIFF SHAFT, ANGLE TIP, .035 DIA, 260 CM,  3 CM TIP"

## (undated) DEVICE — CONTROL WIRE, .018 X 300

## (undated) DEVICE — SHEATH, PINNACLE, 10 CM,  5FR INTRODUCER, 5FR DIA, 2.5 CM DIALATOR

## (undated) DEVICE — SUTURE, PROLENE, 2-0, 18 IN, FS, BLUE

## (undated) DEVICE — STAPLER, SKIN PROXIMATE, 35 WIDE

## (undated) DEVICE — BANDAGE, ELASTIC, MATRIX, SELF-CLOSURE, 4 IN X 5 YD, LF

## (undated) DEVICE — APPLICATOR, CHLORAPREP, W/ORANGE TINT, 26ML

## (undated) DEVICE — COVER, C-ARM W/CLIPS, OEC GE

## (undated) DEVICE — STRAP, CIRCUMFERENTIAL, 2 X 76""

## (undated) DEVICE — SUTURE, VICRYL, 2-0, 27 IN, FS-1, UNDYED

## (undated) DEVICE — CATHETER, 5 FR. 65CM, ANGLE TAPER AT TIP

## (undated) DEVICE — ELECTRODE, ELECTROSURGICAL, BLADE, STANDARD, 2.75 IN

## (undated) DEVICE — IRRIGATION SET, Y, LARGE BORE